# Patient Record
Sex: FEMALE | Race: WHITE | NOT HISPANIC OR LATINO | Employment: OTHER | URBAN - METROPOLITAN AREA
[De-identification: names, ages, dates, MRNs, and addresses within clinical notes are randomized per-mention and may not be internally consistent; named-entity substitution may affect disease eponyms.]

---

## 2017-05-01 ENCOUNTER — ALLSCRIPTS OFFICE VISIT (OUTPATIENT)
Dept: OTHER | Facility: OTHER | Age: 77
End: 2017-05-01

## 2017-05-01 DIAGNOSIS — I10 ESSENTIAL (PRIMARY) HYPERTENSION: ICD-10-CM

## 2017-05-01 DIAGNOSIS — K21.9 GASTRO-ESOPHAGEAL REFLUX DISEASE WITHOUT ESOPHAGITIS: ICD-10-CM

## 2017-05-01 DIAGNOSIS — M79.10 MYALGIA: ICD-10-CM

## 2017-05-01 DIAGNOSIS — Z13.820 ENCOUNTER FOR SCREENING FOR OSTEOPOROSIS: ICD-10-CM

## 2017-05-01 DIAGNOSIS — E03.9 HYPOTHYROIDISM: ICD-10-CM

## 2017-05-01 DIAGNOSIS — Z12.31 ENCOUNTER FOR SCREENING MAMMOGRAM FOR MALIGNANT NEOPLASM OF BREAST: ICD-10-CM

## 2017-05-01 DIAGNOSIS — E78.5 HYPERLIPIDEMIA: ICD-10-CM

## 2017-05-01 DIAGNOSIS — M25.50 PAIN IN JOINT: ICD-10-CM

## 2017-05-05 ENCOUNTER — LAB CONVERSION - ENCOUNTER (OUTPATIENT)
Dept: OTHER | Facility: OTHER | Age: 77
End: 2017-05-05

## 2017-05-05 LAB
A/G RATIO (HISTORICAL): 1.5 (CALC) (ref 1–2.5)
ALBUMIN SERPL BCP-MCNC: 4 G/DL (ref 3.6–5.1)
ALP SERPL-CCNC: 75 U/L (ref 33–130)
ALT SERPL W P-5'-P-CCNC: 36 U/L (ref 6–29)
ANTI-NUCLEAR ANTIBODY (ANA) (HISTORICAL): NEGATIVE
AST SERPL W P-5'-P-CCNC: 24 U/L (ref 10–35)
BASOPHILS # BLD AUTO: 0.1 %
BASOPHILS # BLD AUTO: 9 CELLS/UL (ref 0–200)
BILIRUB SERPL-MCNC: 0.6 MG/DL (ref 0.2–1.2)
BUN SERPL-MCNC: 14 MG/DL (ref 7–25)
BUN/CREA RATIO (HISTORICAL): ABNORMAL (CALC) (ref 6–22)
C-REACTIVE PROTEIN (HISTORICAL): 0.86 MG/DL
CALCIUM SERPL-MCNC: 9.1 MG/DL (ref 8.6–10.4)
CHLORIDE SERPL-SCNC: 101 MMOL/L (ref 98–110)
CHOLEST SERPL-MCNC: 265 MG/DL (ref 125–200)
CHOLEST/HDLC SERPL: 5 (CALC)
CO2 SERPL-SCNC: 28 MMOL/L (ref 20–31)
CREAT SERPL-MCNC: 0.77 MG/DL (ref 0.6–0.93)
DEPRECATED RDW RBC AUTO: 14.3 % (ref 11–15)
EGFR AFRICAN AMERICAN (HISTORICAL): 88 ML/MIN/1.73M2
EGFR-AMERICAN CALC (HISTORICAL): 76 ML/MIN/1.73M2
EOSINOPHIL # BLD AUTO: 3.9 %
EOSINOPHIL # BLD AUTO: 335 CELLS/UL (ref 15–500)
ERYTHROCYTE SEDIMENTATION RATE (HISTORICAL): 9 MM/H
GAMMA GLOBULIN (HISTORICAL): 2.7 G/DL (CALC) (ref 1.9–3.7)
GLUCOSE (HISTORICAL): 124 MG/DL (ref 65–99)
HCT VFR BLD AUTO: 38 % (ref 35–45)
HDLC SERPL-MCNC: 53 MG/DL
HGB BLD-MCNC: 13.2 G/DL (ref 11.7–15.5)
LDL CHOLESTEROL (HISTORICAL): 174 MG/DL (CALC)
LYME IGG/IGM AB (HISTORICAL): <0.9 INDEX
LYMPHOCYTES # BLD AUTO: 2666 CELLS/UL (ref 850–3900)
LYMPHOCYTES # BLD AUTO: 31 %
MCH RBC QN AUTO: 30.1 PG (ref 27–33)
MCHC RBC AUTO-ENTMCNC: 34.7 G/DL (ref 32–36)
MCV RBC AUTO: 86.7 FL (ref 80–100)
MONOCYTES # BLD AUTO: 791 CELLS/UL (ref 200–950)
MONOCYTES (HISTORICAL): 9.2 %
NEUTROPHILS # BLD AUTO: 4799 CELLS/UL (ref 1500–7800)
NEUTROPHILS # BLD AUTO: 55.8 %
NON-HDL-CHOL (CHOL-HDL) (HISTORICAL): 212 MG/DL (CALC)
PLATELET # BLD AUTO: 271 THOUSAND/UL (ref 140–400)
PMV BLD AUTO: 10 FL (ref 7.5–12.5)
POTASSIUM SERPL-SCNC: 3 MMOL/L (ref 3.5–5.3)
RBC # BLD AUTO: 4.39 MILLION/UL (ref 3.8–5.1)
RHEUMATOID FACTOR (HISTORICAL): 9 IU/ML
SODIUM SERPL-SCNC: 145 MMOL/L (ref 135–146)
T4 TOTAL (HISTORICAL): 9.4 MCG/DL (ref 4.5–12)
TOTAL PROTEIN (HISTORICAL): 6.7 G/DL (ref 6.1–8.1)
TRIGL SERPL-MCNC: 192 MG/DL
TSH SERPL DL<=0.05 MIU/L-ACNC: 4.49 MIU/L (ref 0.4–4.5)
WBC # BLD AUTO: 8.6 THOUSAND/UL (ref 3.8–10.8)

## 2017-05-06 ENCOUNTER — GENERIC CONVERSION - ENCOUNTER (OUTPATIENT)
Dept: OTHER | Facility: OTHER | Age: 77
End: 2017-05-06

## 2017-05-15 ENCOUNTER — ALLSCRIPTS OFFICE VISIT (OUTPATIENT)
Dept: OTHER | Facility: OTHER | Age: 77
End: 2017-05-15

## 2017-08-15 DIAGNOSIS — Z79.899 OTHER LONG TERM (CURRENT) DRUG THERAPY: ICD-10-CM

## 2017-08-15 DIAGNOSIS — E78.5 HYPERLIPIDEMIA: ICD-10-CM

## 2017-08-28 ENCOUNTER — LAB CONVERSION - ENCOUNTER (OUTPATIENT)
Dept: OTHER | Facility: OTHER | Age: 77
End: 2017-08-28

## 2017-08-28 ENCOUNTER — ALLSCRIPTS OFFICE VISIT (OUTPATIENT)
Dept: OTHER | Facility: OTHER | Age: 77
End: 2017-08-28

## 2017-08-28 LAB
CHOLEST SERPL-MCNC: 282 MG/DL
CHOLEST/HDLC SERPL: 5.9 (CALC)
HDLC SERPL-MCNC: 48 MG/DL
LDL CHOLESTEROL (HISTORICAL): 196 MG/DL (CALC)
NON-HDL-CHOL (CHOL-HDL) (HISTORICAL): 234 MG/DL (CALC)
TRIGL SERPL-MCNC: 198 MG/DL

## 2017-08-29 ENCOUNTER — GENERIC CONVERSION - ENCOUNTER (OUTPATIENT)
Dept: OTHER | Facility: OTHER | Age: 77
End: 2017-08-29

## 2017-09-08 ENCOUNTER — LAB CONVERSION - ENCOUNTER (OUTPATIENT)
Dept: OTHER | Facility: OTHER | Age: 77
End: 2017-09-08

## 2017-09-08 ENCOUNTER — GENERIC CONVERSION - ENCOUNTER (OUTPATIENT)
Dept: OTHER | Facility: OTHER | Age: 77
End: 2017-09-08

## 2017-09-08 LAB — FECAL GLOBIN BY IMMUNOCHEM (HISTORICAL): NORMAL

## 2017-11-13 ENCOUNTER — HOSPITAL ENCOUNTER (INPATIENT)
Facility: HOSPITAL | Age: 77
LOS: 2 days | Discharge: HOME HEALTH | DRG: 065 | End: 2017-11-15
Attending: EMERGENCY MEDICINE | Admitting: INTERNAL MEDICINE
Payer: COMMERCIAL

## 2017-11-13 ENCOUNTER — APPOINTMENT (EMERGENCY)
Dept: RADIOLOGY | Facility: HOSPITAL | Age: 77
DRG: 065 | End: 2017-11-13
Payer: COMMERCIAL

## 2017-11-13 ENCOUNTER — APPOINTMENT (EMERGENCY)
Dept: CT IMAGING | Facility: HOSPITAL | Age: 77
DRG: 065 | End: 2017-11-13
Payer: COMMERCIAL

## 2017-11-13 ENCOUNTER — APPOINTMENT (INPATIENT)
Dept: MRI IMAGING | Facility: HOSPITAL | Age: 77
DRG: 065 | End: 2017-11-13
Payer: COMMERCIAL

## 2017-11-13 DIAGNOSIS — I63.9 ACUTE CVA (CEREBROVASCULAR ACCIDENT) (HCC): ICD-10-CM

## 2017-11-13 DIAGNOSIS — R20.0 NUMBNESS: ICD-10-CM

## 2017-11-13 DIAGNOSIS — E87.6 HYPOKALEMIA: ICD-10-CM

## 2017-11-13 DIAGNOSIS — I10 HYPERTENSION: ICD-10-CM

## 2017-11-13 DIAGNOSIS — R20.0 LUE NUMBNESS: ICD-10-CM

## 2017-11-13 DIAGNOSIS — R29.898 WEAKNESS OF RIGHT LOWER EXTREMITY: Primary | ICD-10-CM

## 2017-11-13 PROBLEM — M54.31 RIGHT SIDED SCIATICA: Chronic | Status: ACTIVE | Noted: 2017-11-13

## 2017-11-13 PROBLEM — E78.5 HLD (HYPERLIPIDEMIA): Status: ACTIVE | Noted: 2017-11-13

## 2017-11-13 LAB
ALBUMIN SERPL BCP-MCNC: 3.7 G/DL (ref 3.5–5)
ALP SERPL-CCNC: 85 U/L (ref 46–116)
ALT SERPL W P-5'-P-CCNC: 49 U/L (ref 12–78)
ANION GAP SERPL CALCULATED.3IONS-SCNC: 10 MMOL/L (ref 4–13)
APTT PPP: 28 SECONDS (ref 23–35)
AST SERPL W P-5'-P-CCNC: 29 U/L (ref 5–45)
ATRIAL RATE: 97 BPM
BASOPHILS # BLD AUTO: 0.02 THOUSANDS/ΜL (ref 0–0.1)
BASOPHILS NFR BLD AUTO: 0 % (ref 0–1)
BILIRUB SERPL-MCNC: 0.74 MG/DL (ref 0.2–1)
BUN SERPL-MCNC: 13 MG/DL (ref 5–25)
CALCIUM SERPL-MCNC: 8.7 MG/DL (ref 8.3–10.1)
CHLORIDE SERPL-SCNC: 100 MMOL/L (ref 100–108)
CO2 SERPL-SCNC: 32 MMOL/L (ref 21–32)
CREAT SERPL-MCNC: 0.69 MG/DL (ref 0.6–1.3)
EOSINOPHIL # BLD AUTO: 0.14 THOUSAND/ΜL (ref 0–0.61)
EOSINOPHIL NFR BLD AUTO: 2 % (ref 0–6)
ERYTHROCYTE [DISTWIDTH] IN BLOOD BY AUTOMATED COUNT: 13.1 % (ref 11.6–15.1)
GFR SERPL CREATININE-BSD FRML MDRD: 85 ML/MIN/1.73SQ M
GLUCOSE SERPL-MCNC: 144 MG/DL (ref 65–140)
HCT VFR BLD AUTO: 41.9 % (ref 34.8–46.1)
HGB BLD-MCNC: 14.7 G/DL (ref 11.5–15.4)
INR PPP: 0.98 (ref 0.86–1.16)
LYMPHOCYTES # BLD AUTO: 2.15 THOUSANDS/ΜL (ref 0.6–4.47)
LYMPHOCYTES NFR BLD AUTO: 25 % (ref 14–44)
MCH RBC QN AUTO: 30.9 PG (ref 26.8–34.3)
MCHC RBC AUTO-ENTMCNC: 35.1 G/DL (ref 31.4–37.4)
MCV RBC AUTO: 88 FL (ref 82–98)
MONOCYTES # BLD AUTO: 0.98 THOUSAND/ΜL (ref 0.17–1.22)
MONOCYTES NFR BLD AUTO: 11 % (ref 4–12)
NEUTROPHILS # BLD AUTO: 5.46 THOUSANDS/ΜL (ref 1.85–7.62)
NEUTS SEG NFR BLD AUTO: 62 % (ref 43–75)
NRBC BLD AUTO-RTO: 0 /100 WBCS
NT-PROBNP SERPL-MCNC: 558 PG/ML
P AXIS: 58 DEGREES
PLATELET # BLD AUTO: 260 THOUSANDS/UL (ref 149–390)
PMV BLD AUTO: 11.7 FL (ref 8.9–12.7)
POTASSIUM SERPL-SCNC: 2.6 MMOL/L (ref 3.5–5.3)
PR INTERVAL: 132 MS
PROT SERPL-MCNC: 7.6 G/DL (ref 6.4–8.2)
PROTHROMBIN TIME: 13 SECONDS (ref 12.1–14.4)
QRS AXIS: 47 DEGREES
QRSD INTERVAL: 70 MS
QT INTERVAL: 362 MS
QTC INTERVAL: 459 MS
RBC # BLD AUTO: 4.75 MILLION/UL (ref 3.81–5.12)
SODIUM SERPL-SCNC: 142 MMOL/L (ref 136–145)
SPECIMEN SOURCE: NORMAL
T WAVE AXIS: 77 DEGREES
TROPONIN I BLD-MCNC: 0.02 NG/ML (ref 0–0.08)
VENTRICULAR RATE: 97 BPM
WBC # BLD AUTO: 8.75 THOUSAND/UL (ref 4.31–10.16)

## 2017-11-13 PROCEDURE — 70551 MRI BRAIN STEM W/O DYE: CPT

## 2017-11-13 PROCEDURE — 93005 ELECTROCARDIOGRAM TRACING: CPT | Performed by: EMERGENCY MEDICINE

## 2017-11-13 PROCEDURE — 84484 ASSAY OF TROPONIN QUANT: CPT

## 2017-11-13 PROCEDURE — 83880 ASSAY OF NATRIURETIC PEPTIDE: CPT | Performed by: EMERGENCY MEDICINE

## 2017-11-13 PROCEDURE — 36415 COLL VENOUS BLD VENIPUNCTURE: CPT | Performed by: EMERGENCY MEDICINE

## 2017-11-13 PROCEDURE — 85610 PROTHROMBIN TIME: CPT | Performed by: EMERGENCY MEDICINE

## 2017-11-13 PROCEDURE — 85730 THROMBOPLASTIN TIME PARTIAL: CPT | Performed by: EMERGENCY MEDICINE

## 2017-11-13 PROCEDURE — 85025 COMPLETE CBC W/AUTO DIFF WBC: CPT | Performed by: EMERGENCY MEDICINE

## 2017-11-13 PROCEDURE — 80053 COMPREHEN METABOLIC PANEL: CPT | Performed by: EMERGENCY MEDICINE

## 2017-11-13 PROCEDURE — 70450 CT HEAD/BRAIN W/O DYE: CPT

## 2017-11-13 PROCEDURE — 96365 THER/PROPH/DIAG IV INF INIT: CPT

## 2017-11-13 PROCEDURE — 70544 MR ANGIOGRAPHY HEAD W/O DYE: CPT

## 2017-11-13 PROCEDURE — 99285 EMERGENCY DEPT VISIT HI MDM: CPT

## 2017-11-13 PROCEDURE — 71020 HB CHEST X-RAY 2VW FRONTAL&LATL: CPT

## 2017-11-13 RX ORDER — HYDRALAZINE HYDROCHLORIDE 20 MG/ML
5 INJECTION INTRAMUSCULAR; INTRAVENOUS EVERY 4 HOURS PRN
Status: DISCONTINUED | OUTPATIENT
Start: 2017-11-13 | End: 2017-11-15 | Stop reason: HOSPADM

## 2017-11-13 RX ORDER — POTASSIUM CHLORIDE 14.9 MG/ML
20 INJECTION INTRAVENOUS ONCE
Status: COMPLETED | OUTPATIENT
Start: 2017-11-13 | End: 2017-11-13

## 2017-11-13 RX ORDER — ACETAMINOPHEN 325 MG/1
650 TABLET ORAL EVERY 4 HOURS PRN
Status: DISCONTINUED | OUTPATIENT
Start: 2017-11-13 | End: 2017-11-15 | Stop reason: HOSPADM

## 2017-11-13 RX ORDER — ASPIRIN 325 MG
325 TABLET ORAL DAILY
Status: DISCONTINUED | OUTPATIENT
Start: 2017-11-13 | End: 2017-11-15 | Stop reason: HOSPADM

## 2017-11-13 RX ORDER — LABETALOL HYDROCHLORIDE 5 MG/ML
10 INJECTION, SOLUTION INTRAVENOUS ONCE
Status: DISCONTINUED | OUTPATIENT
Start: 2017-11-13 | End: 2017-11-13

## 2017-11-13 RX ORDER — ONDANSETRON 2 MG/ML
4 INJECTION INTRAMUSCULAR; INTRAVENOUS EVERY 6 HOURS PRN
Status: CANCELLED | OUTPATIENT
Start: 2017-11-13

## 2017-11-13 RX ORDER — POTASSIUM CHLORIDE 20 MEQ/1
40 TABLET, EXTENDED RELEASE ORAL
Status: COMPLETED | OUTPATIENT
Start: 2017-11-13 | End: 2017-11-14

## 2017-11-13 RX ORDER — POTASSIUM CHLORIDE 20 MEQ/1
40 TABLET, EXTENDED RELEASE ORAL ONCE
Status: COMPLETED | OUTPATIENT
Start: 2017-11-13 | End: 2017-11-13

## 2017-11-13 RX ORDER — ATORVASTATIN CALCIUM 40 MG/1
40 TABLET, FILM COATED ORAL EVERY EVENING
Status: DISCONTINUED | OUTPATIENT
Start: 2017-11-13 | End: 2017-11-14

## 2017-11-13 RX ORDER — AMLODIPINE BESYLATE 5 MG/1
10 TABLET ORAL 2 TIMES DAILY
COMMUNITY
End: 2018-01-26 | Stop reason: DRUGHIGH

## 2017-11-13 RX ADMIN — POTASSIUM CHLORIDE 20 MEQ: 200 INJECTION, SOLUTION INTRAVENOUS at 11:13

## 2017-11-13 RX ADMIN — POTASSIUM CHLORIDE 20 MEQ: 200 INJECTION, SOLUTION INTRAVENOUS at 16:16

## 2017-11-13 RX ADMIN — HYDRALAZINE HYDROCHLORIDE 5 MG: 20 INJECTION INTRAMUSCULAR; INTRAVENOUS at 18:04

## 2017-11-13 RX ADMIN — HYDRALAZINE HYDROCHLORIDE 5 MG: 20 INJECTION INTRAMUSCULAR; INTRAVENOUS at 13:35

## 2017-11-13 RX ADMIN — ENOXAPARIN SODIUM 40 MG: 40 INJECTION SUBCUTANEOUS at 18:04

## 2017-11-13 RX ADMIN — ASPIRIN 325 MG: 325 TABLET ORAL at 12:29

## 2017-11-13 RX ADMIN — POTASSIUM CHLORIDE 40 MEQ: 1500 TABLET, EXTENDED RELEASE ORAL at 18:04

## 2017-11-13 RX ADMIN — POTASSIUM CHLORIDE 40 MEQ: 1500 TABLET, EXTENDED RELEASE ORAL at 11:10

## 2017-11-13 NOTE — ED PROVIDER NOTES
History  Chief Complaint   Patient presents with    Numbness     c/o left arm numbness and right leg numbness started at approx  0840  No stroke hx  Denies cp/sob/headache  c/o feeling lightheaded  68 YO female presents with numbness and weakness noticed this morning on waking  Pt states she feels decreased sensation as well as tingling in the Right upper and lower extremities  She states she did walk but but feels weaker in the Right leg, notes difficulty with walking  Pt notes she has been on amlodipine, prescribed by her PCP, feels this has not been adequately treating her blood pressure  Pt does have some lightheadedness, denies chest pain, shortness of breath or headaches  Pt denies CP/SOB/F/C/N/V/D/C, no dysuria, burning on urination or blood in urine  History provided by:  Patient and relative   used: No    CVA/TIA-like Symptoms   Presenting symptoms: sensory loss    Presenting symptoms: no confusion, no headaches and no weakness    Onset quality:  Unable to specify  Timing:  Constant  Progression:  Unchanged  Associated symptoms: no chest pain, no dizziness, no fall, no fever, no bladder incontinence, no seizures, no tinnitus and no vomiting        Prior to Admission Medications   Prescriptions Last Dose Informant Patient Reported? Taking? Omeprazole Magnesium (PRILOSEC OTC PO) 11/13/2017 at Unknown time  Yes Yes   Sig: Take by mouth daily Indications: unsure of dose  Riboflavin 400 MG CAPS Unknown at Unknown time  No No   Sig: Take 1 capsule by mouth daily  amLODIPine (NORVASC) 5 mg tablet 11/13/2017 at Unknown time  Yes Yes   Sig: Take 5 mg by mouth daily   aspirin 81 MG tablet 11/13/2017 at Unknown time  Yes Yes   Sig: Take 81 mg by mouth daily  Facility-Administered Medications: None       Past Medical History:   Diagnosis Date    Hyperlipidemia     Hypertension        History reviewed  No pertinent surgical history  History reviewed   No pertinent family history  I have reviewed and agree with the history as documented  Social History   Substance Use Topics    Smoking status: Former Smoker    Smokeless tobacco: Never Used    Alcohol use No        Review of Systems   Constitutional: Negative for chills, fatigue and fever  HENT: Negative for dental problem and tinnitus  Eyes: Negative for visual disturbance  Respiratory: Negative for shortness of breath  Cardiovascular: Negative for chest pain  Gastrointestinal: Negative for abdominal pain, diarrhea and vomiting  Genitourinary: Negative for bladder incontinence, dysuria and frequency  Musculoskeletal: Negative for arthralgias  Skin: Negative for rash  Neurological: Negative for dizziness, seizures, weakness, light-headedness and headaches  Psychiatric/Behavioral: Negative for agitation, behavioral problems and confusion  All other systems reviewed and are negative  Physical Exam  ED Triage Vitals   Temperature Pulse Respirations Blood Pressure SpO2   11/13/17 1002 11/13/17 1002 11/13/17 1002 11/13/17 1002 11/13/17 1002   98 2 °F (36 8 °C) 91 18 (!) 226/132 97 %      Temp Source Heart Rate Source Patient Position - Orthostatic VS BP Location FiO2 (%)   11/13/17 1002 11/13/17 1054 11/13/17 1054 11/13/17 1054 --   Temporal Monitor Lying Right arm       Pain Score       11/13/17 1002       No Pain           Orthostatic Vital Signs  Vitals:    11/13/17 1229 11/13/17 1331 11/13/17 1503 11/13/17 1750   BP: (!) 182/89 (!) 204/93 169/74 (!) 200/90   Pulse: 81 87 84 91   Patient Position - Orthostatic VS: Lying Lying Lying Lying       Physical Exam   Constitutional: She is oriented to person, place, and time  She appears well-developed and well-nourished  HENT:   Head: Normocephalic and atraumatic  Eyes: EOM are normal  Pupils are equal, round, and reactive to light  Neck: Normal range of motion  Cardiovascular: Normal rate, regular rhythm and normal heart sounds  Pulmonary/Chest: Effort normal and breath sounds normal    Abdominal: Soft  Musculoskeletal: Normal range of motion  Neurological: She is alert and oriented to person, place, and time  Skin: Skin is warm and dry  Psychiatric: She has a normal mood and affect  Her behavior is normal  Thought content normal    Nursing note and vitals reviewed        ED Medications  Medications   aspirin tablet 325 mg (325 mg Oral Given 11/13/17 1229)   atorvastatin (LIPITOR) tablet 40 mg (40 mg Oral Given 11/13/17 1804)   acetaminophen (TYLENOL) tablet 650 mg (not administered)   enoxaparin (LOVENOX) subcutaneous injection 40 mg (40 mg Subcutaneous Given 11/13/17 1804)   potassium chloride (K-DUR,KLOR-CON) CR tablet 40 mEq (40 mEq Oral Given 11/13/17 1804)   hydrALAZINE (APRESOLINE) injection 5 mg (5 mg Intravenous Given 11/13/17 1804)   potassium chloride 20 mEq IVPB (premix) (0 mEq Intravenous Stopped 11/13/17 1317)   potassium chloride (K-DUR,KLOR-CON) CR tablet 40 mEq (40 mEq Oral Given 11/13/17 1110)   potassium chloride 20 mEq IVPB (premix) (20 mEq Intravenous New Bag 11/13/17 1616)       Diagnostic Studies  Results Reviewed     Procedure Component Value Units Date/Time    Comprehensive metabolic panel [[id number]]  (Abnormal) Collected:  11/13/17 1019    Lab Status:  Final result Specimen:  Blood from Arm, Left Updated:  11/13/17 1052     Sodium 142 mmol/L      Potassium 2 6 (LL) mmol/L      Chloride 100 mmol/L      CO2 32 mmol/L      Anion Gap 10 mmol/L      BUN 13 mg/dL      Creatinine 0 69 mg/dL      Glucose 144 (H) mg/dL      Calcium 8 7 mg/dL      AST 29 U/L      ALT 49 U/L      Alkaline Phosphatase 85 U/L      Total Protein 7 6 g/dL      Albumin 3 7 g/dL      Total Bilirubin 0 74 mg/dL      eGFR 85 ml/min/1 73sq m     Narrative:         National Kidney Disease Education Program recommendations are as follows:  GFR calculation is accurate only with a steady state creatinine  Chronic Kidney disease less than 60 ml/min/1 73 sq  meters  Kidney failure less than 15 ml/min/1 73 sq  meters  B-type natriuretic peptide [[id number]]  (Abnormal) Collected:  11/13/17 1019    Lab Status:  Final result Specimen:  Blood from Arm, Left Updated:  11/13/17 1050     NT-proBNP 558 (H) pg/mL     Protime-INR [[id number]]  (Normal) Collected:  11/13/17 1019    Lab Status:  Final result Specimen:  Blood from Arm, Left Updated:  11/13/17 1035     Protime 13 0 seconds      INR 0 98    APTT [[id number]]  (Normal) Collected:  11/13/17 1019    Lab Status:  Final result Specimen:  Blood from Arm, Left Updated:  11/13/17 1035     PTT 28 seconds     Narrative: Therapeutic Heparin Range = 60-90 seconds    POCT troponin [[id number]]  (Normal) Collected:  11/13/17 1014    Lab Status:  Final result Updated:  11/13/17 1027     POC Troponin I 0 02 ng/ml      Specimen Type VENOUS    Narrative:         [employer] i-Stat handheld analyzer 99% cutoff is > 0 08ng/mL in [facility] Emergency Dep[facility]    o cTnI 99% cutoff is useful only when applied to patients in the clinical setting of myocardial ischemia  o cTnI 99% cutoff should be interpreted in the context of clinical history, ECG findings and possibly cardiac imaging to establish correct diagnosis  o cTnI 99% cutoff may be suggestive but clearly not indicative of a coronary event without the clinical setting of myocardial ischemia      CBC and differential [[id number]]  (Normal) Collected:  11/13/17 1019    Lab Status:  Final result Specimen:  Blood from Arm, Left Updated:  11/13/17 1026     WBC 8 75 Thousand/uL      RBC 4 75 Million/uL      Hemoglobin 14 7 g/dL      Hematocrit 41 9 %      MCV 88 fL      MCH 30 9 pg      MCHC 35 1 g/dL      RDW 13 1 %      MPV 11 7 fL      Platelets 258 Thousands/uL      nRBC 0 /100 WBCs      Neutrophils Relative 62 %      Lymphocytes Relative 25 %      Monocytes Relative 11 %      Eosinophils Relative 2 %      Basophils Relative 0 %      Neutrophils Absolute 5 46 Thousands/µL Lymphocytes Absolute 2 15 Thousands/µL      Monocytes Absolute 0 98 Thousand/µL      Eosinophils Absolute 0 14 Thousand/µL      Basophils Absolute 0 02 Thousands/µL                  X-ray chest 2 views   Final Result by Roxanna Mohr DO (11/13 1126)      No active pulmonary disease  Workstation performed: [id number]         CT head without contrast   Final Result by Nima Andrade MD (11/13 1047)      No acute intracranial abnormality  Microangiopathic changes  Workstation performed: [id number]         MRI brain wo contrast    (Results Pending)   MRA and or MRV head wo contrast    (Results Pending)              Procedures  Procedures       Phone Contacts  ED Phone Contact    ED Course  ED Course              NIH Stroke Scale    Flowsheet Row Most Recent Value   Level of Consciousness (1a )  0 Filed at: 11/13/2017 1750   LOC Questions (1b )  0 Filed at: 11/13/2017 1750   LOC Commands (1c )  0 Filed at: 11/13/2017 1750   Best Gaze (2 )  0 Filed at: 11/13/2017 1750   Visual (3 )  0 Filed at: 11/13/2017 1750   Facial Palsy (4 )  0 Filed at: 11/13/2017 1750   Motor Arm, Left (5a )  0 Filed at: 11/13/2017 1750   Motor Arm, Right (5b )  0 Filed at: 11/13/2017 1750   Motor Leg, Left (6a )  0 Filed at: 11/13/2017 1750   Motor Leg, Right (6b )  0 Filed at: 11/13/2017 1750   Limb Ataxia (7 )  0 Filed at: 11/13/2017 1750   Sensory (8 )  0 Filed at: 11/13/2017 1750   Best Language (9 )  0 Filed at: 11/13/2017 1750   Dysarthria (10 )  0 Filed at: 11/13/2017 1750   Extinction and Inattention (11 ) (Formerly Neglect)  0 Filed at: 11/13/2017 1750   Total  0 Filed at: 11/13/2017 1750                        MDM  Number of Diagnoses or Management Options  Hypertension: new and requires workup  Hypokalemia: new and requires workup  Numbness: new and requires workup  Diagnosis management comments: 1  Numbness - Pt with continuing numbness, noted on waking, no objective weakness   Symptoms began during sleep, pt is not a candidate for TPA  Will obtain CT of head, check coag's, fluids and give medications for blood pressure management         Amount and/or Complexity of Data Reviewed  Clinical lab tests: ordered and reviewed  Tests in the radiology section of CPT®: ordered and reviewed  Obtain history from someone other than the patient: yes  Discuss the patient with other providers: yes  Independent visualization of images, tracings, or specimens: yes    Patient Progress  Patient progress: stable    CritCare Time    Disposition  Final diagnoses:   Numbness   Hypertension   Hypokalemia     Time reflects when diagnosis was documented in both MDM as applicable and the Disposition within this note     Time User Action Codes Description Comment    11/13/2017 11:54 AM Marques Fitzpatrick Add [R20 0] Numbness     11/13/2017 11:54 AM Marques Fitzpatrick Add [I10] Hypertension     11/13/2017 11:54 AM Marques Fitzpatrick Add [E87 6] Hypokalemia     11/13/2017 12:28 PM Fernando Ricketts M Modify [E87 6] Hypokalemia     11/13/2017 12:28 PM Sasha Valencia Add [R20 0] LUE numbness     11/13/2017 12:28 PM Fernando Monas M Modify [E87 6] Hypokalemia     11/13/2017 12:28 PM Sasha Valencia Modify [R20 0] LUE numbness     11/13/2017 12:28 PM Fernando Ricketts M Modify [E87 6] Hypokalemia     11/13/2017 12:28 PM Fernando Ricketts M Modify [E87 6] Hypokalemia     11/13/2017 12:28 PM Sasha Valencia Add [R29 898] Weakness of right lower extremity     11/13/2017 12:28 PM Gayaline Monas M Modify [E87 6] Hypokalemia     11/13/2017 12:28 PM Gayaline Monas M Modify [R29 898] Weakness of right lower extremity     11/13/2017 12:28 PM Gayaline Monas M Modify [E87 6] Hypokalemia     11/13/2017 12:29 PM Gayland Monas M Modify [E87 6] Hypokalemia     11/13/2017 12:31 PM Jacquelyn Waite Modify [E87 6] Hypokalemia       ED Disposition     ED Disposition Condition Comment    Admit  Case was discussed with [facility] PA and the patient's admission status was agreed to be Admission Status: inpatient status to the service of Dr Ines Ovalles   Follow-up Information    None       Current Discharge Medication List      CONTINUE these medications which have NOT CHANGED    Details   amLODIPine (NORVASC) 5 mg tablet Take 5 mg by mouth daily      aspirin 81 MG tablet Take 81 mg by mouth daily  Omeprazole Magnesium (PRILOSEC OTC PO) Take by mouth daily Indications: unsure of dose  Riboflavin 400 MG CAPS Take 1 capsule by mouth daily  Qty: 20 Each, Refills: 0           No discharge procedures on file      ED Provider  Electronically Signed by           Lily Milian MD  11/13/17 0719

## 2017-11-13 NOTE — H&P
History and Physical - [facility] Internal Medicine    Patient Information: Duong Holguin 69 y o  female MRN: [id number]  Unit/Bed#: ED 10 Encounter: [id number]  Admitting Physician: Quyen Gonzalez PA-C  PCP: Dafne Osborne DO  Date of Admission:  11/13/17    Assessment/Plan:    Hospital Problem List:     Active Problems:    Weakness of right lower extremity    LUE numbness    HTN (hypertension)    HLD (hyperlipidemia)    Hypokalemia      Plan for the Primary Problem(s):  · Weakness of RLE  · Acute onset associated w/LUE numbness this AM  · Atypical stroke like s/sx however will r/o CVA at this time, may be 2* hypokalemia of 2 6 although s/sx are again unilateral and crossed  · CVA Risk factors: HTN, HLD, former smoker 10 pack year hx, and obesity  · No back pain, no knee injury or fall, new onset s/sx  · CT head negative for acute hemorrhage, ischemia, midline shift, but does demonstrate mild microvascular angiopathy changes  · EKG demonstrates normal sinus rhythm with occasional PAC, however no T-wave flattening or Peakked T-waves  · Chest x-ray unremarkable  · Will start  mg daily, admit patient to stroke pathway, no tPA given last known time of well is unknown  · Check lipid panel, check hemoglobin A1c, allow permissive hypertension with hydralazine p r n   SBP greater than 200 mm of mercury, start telemetry check MRI brain MRA head, check TTE, consult Neurology, neuro checks, Consult PT OT for ambulatory dysfunction  · Patient is confirmed DNI/DNR      Plan for Additional Problems:   Hypokalemia   Moderate-severe, unclear etiology   rec'd K rider 20meq and PO 20meq potassium in ED   Will continue w/additional 40meq today (20meq PO and 20meq IV for 80meq total today and 20meq in AM   Continue telemetry per #1, check magnesium    · HTN  · Hold amlodipine per #1, and hydralazine PRN SBP> 200mmHg    HLD   Pt w/hx of myalgias on zocor/atorvastatin   R/B ratio at this time favors statin initiation as pt has no true allergies   F/u lipid panel        VTE Prophylaxis: Enoxaparin (Lovenox)  / sequential compression device   Code Status: DNI/DNR  POLST: There is no POLST form on file for this patient (pre-hospital)    Anticipated Length of Stay:  Patient will be admitted on an Inpatient basis with an anticipated length of stay of  Greater than 2 midnights  Justification for Hospital Stay: r/o CVA    Total Time for Visit, including Counseling / Coordination of Care: 45 minutes  Greater than 50% of this total time spent on direct patient counseling and coordination of care  Chief Complaint:   Right lower extremity weakness and left upper extremity numbness upon waking this morning  History of Present Illness:    Jorge Devlin is a 69 y o  female who presents with PMH of hypertension, hyperlipidemia, 10 pack year smoking history in former smoker status, obesity coming in the ED for right lower extremity weakness left upper extremity numbness since approximately a m  this morning patient woke up and felt something was slightly off she swung her legs the side of the bed and felt that her right lower extremity was not feeling normal   She started to ambulate and had to lean against the wall as she felt like her 0 entire right leg was going to give out on her  She also felt lightheaded with this  She denied any associated pain in her knee or back or hip with this  She reports this is never happened before  She called her son by telephone as the patient lives home alone and he brought her to the ER for evaluation  She denies any falls  On review of system, the patient also notes some left upper extremity numbness, throughout the 1st through 5th fingers going up into the forearm of the same duration  The patient remembers Last time of being normal was about 2100 hours the night before when she was in bed and she was in her normal state of health at that time        She has no blurry vision/slurred speech, headache or weakness in her RUE or LUE  She has no LLE weakness or numbness  No diarrhea/vomiting  No recent illnesses  No family hx of CVA  She had sciatica problems of the RLE in 08/2017 but her pain is now resolved and she is not having back pain at this time  Review of Systems:    Review of Systems   Constitutional: Negative for chills and fever  Eyes: Negative for photophobia  Respiratory: Negative for shortness of breath  Cardiovascular: Negative for chest pain  Gastrointestinal: Negative for abdominal pain, diarrhea, nausea and vomiting  Musculoskeletal: Positive for arthralgias and gait problem  Neurological: Positive for weakness, light-headedness and numbness  Negative for dizziness, seizures, syncope, facial asymmetry and headaches  All other systems reviewed and are negative  Past Medical and Surgical History:     Past Medical History:   Diagnosis Date    Hyperlipidemia     Hypertension        History reviewed  No pertinent surgical history  Meds/Allergies:    Prior to Admission medications    Medication Sig Start Date End Date Taking? Authorizing Provider   amLODIPine (NORVASC) 5 mg tablet Take 5 mg by mouth daily   Yes Historical Provider, MD   aspirin 81 MG tablet Take 81 mg by mouth daily  Yes Historical Provider, MD   Omeprazole Magnesium (PRILOSEC OTC PO) Take by mouth daily Indications: unsure of dose  Yes Historical Provider, MD   Riboflavin 400 MG CAPS Take 1 capsule by mouth daily  3/30/16   Delmi Capellan MD   magnesium oxide (MAG-OX) 400 mg Take 1 tablet (400 mg total) by mouth daily  3/30/16 11/13/17  Delmi Capellan MD   simvastatin (ZOCOR) 20 mg tablet Take 20 mg by mouth daily at bedtime Indications: pt has not taken this for 3 days now due to sx   11/13/17  Historical Provider, MD     I have reviewed home medications with patient personally  Allergies:    Allergies   Allergen Reactions    Penicillins Swelling    Sulfa Antibiotics Hives Social History:     Marital Status: Single   Occupation:   Patient Pre-hospital Living Situation:   Patient Pre-hospital Level of Mobility:   Patient Pre-hospital Diet Restrictions:   Substance Use History:   History   Alcohol Use No     History   Smoking Status    Former Smoker   Smokeless Tobacco    Never Used     History   Drug Use No       Family History:    no family hx of CVA    Physical Exam:     Vitals:   Blood Pressure: (!) 182/89 (11/13/17 1229)  Pulse: 81 (11/13/17 1229)  Temperature: 98 2 °F (36 8 °C) (11/13/17 1002)  Temp Source: Temporal (11/13/17 1002)  Respirations: 17 (11/13/17 1229)  Weight - Scale: 86 2 kg (190 lb) (11/13/17 1002)  SpO2: 99 % (11/13/17 1229)    Physical Exam   Constitutional: She is oriented to person, place, and time  She appears well-developed  No distress  HENT:   Head: Normocephalic and atraumatic  Right Ear: External ear normal    Left Ear: External ear normal    Eyes: Conjunctivae are normal  Pupils are equal, round, and reactive to light  Right eye exhibits no discharge  Left eye exhibits no discharge  No scleral icterus  Neck: Normal range of motion  Cardiovascular: Normal rate, regular rhythm, normal heart sounds and intact distal pulses  Exam reveals no gallop and no friction rub  No murmur heard  Pulmonary/Chest: Effort normal and breath sounds normal  No respiratory distress  She has no wheezes  She has no rales  She exhibits no tenderness  Abdominal: Soft  She exhibits no distension  There is no tenderness  There is no rebound and no guarding  obese   Musculoskeletal: She exhibits no edema  Neurological: She is alert and oriented to person, place, and time  She displays normal reflexes  No cranial nerve deficit  She exhibits abnormal muscle tone (3-5 strength in RLE w/ SLR  upper extremities 5/5 b/l w/ strength/elbow f/e, leg strength 5/5 in LLE)   Coordination (no diadochokinesis, differed ambulatory assessment) normal    Crude sensation intact in UE/LE b/l  No pronator drift  Extinction noted w/LUE dorsum of hand and RLE anterior shin    NIH stroke scale of 2  Due to strength in RLE against gravit and 1 for tactile inattention as noted above for total of 3  Skin: Skin is warm and dry  She is not diaphoretic  Psychiatric: She has a normal mood and affect  Vitals reviewed  Additional Data:     Lab Results: I have personally reviewed pertinent reports  Results from last 7 days  Lab Units 11/13/17  1019   WBC Thousand/uL 8 75   HEMOGLOBIN g/dL 14 7   HEMATOCRIT % 41 9   PLATELETS Thousands/uL 260   NEUTROS PCT % 62   LYMPHS PCT % 25   MONOS PCT % 11   EOS PCT % 2       Results from last 7 days  Lab Units 11/13/17  1019   SODIUM mmol/L 142   POTASSIUM mmol/L 2 6*   CHLORIDE mmol/L 100   CO2 mmol/L 32   BUN mg/dL 13   CREATININE mg/dL 0 69   CALCIUM mg/dL 8 7   TOTAL PROTEIN g/dL 7 6   BILIRUBIN TOTAL mg/dL 0 74   ALK PHOS U/L 85   ALT U/L 49   AST U/L 29   GLUCOSE RANDOM mg/dL 144*       Results from last 7 days  Lab Units 11/13/17  1019   INR  0 98       Imaging: I have personally reviewed pertinent reports   , I have personally reviewed pertinent films in PACS and no cardiomegaly, vascular congestion or infiltration on CXR  no dissection or pleural effusion evideneced on PA or lat view    X-ray Chest 2 Views    Result Date: 11/13/2017  Narrative: CHEST INDICATION:  Left arm numbness and dizziness  COMPARISON:  None VIEWS:  Frontal and lateral projections IMAGES:  2 FINDINGS:     Cardiomediastinal silhouette appears unremarkable  The lungs are clear  No pneumothorax or pleural effusion  Visualized osseous structures appear within normal limits for the patient's age  Impression: No active pulmonary disease   Workstation performed: [id number]     Ct Head Without Contrast    Result Date: 11/13/2017  Narrative: CT BRAIN - WITHOUT CONTRAST INDICATION:  Numbness in right leg and left arm COMPARISON:  3/30/2016 TECHNIQUE:  CT examination of the brain was performed  In addition to axial images, coronal reformatted images were created and submitted for interpretation  Radiation dose length product (DLP) for this visit:  981 mGy-cm   This examination, like all CT scans performed in the [facility], was performed utilizing techniques to minimize radiation dose exposure, including the use of iterative reconstruction and automated exposure control  IMAGE QUALITY:  Diagnostic  FINDINGS:  PARENCHYMA:  Decreased attenuation is noted in the supratentorial white matter demonstrating an appearance most consistent with mild microangiopathic change  No intracranial mass, mass effect or midline shift  No CT signs of acute infarction  There is no parenchymal hemorrhage  VENTRICLES AND EXTRA-AXIAL SPACES:  Age-appropriate volume loss is noted  No hydrocephalus  VISUALIZED ORBITS AND PARANASAL SINUSES:  Unremarkable  CALVARIUM AND EXTRACRANIAL SOFT TISSUES:   Normal      Impression: No acute intracranial abnormality  Microangiopathic changes  Workstation performed: [id number]       EKG, Pathology, and Other Studies Reviewed on Admission:   · EKG: NSR w/one PAC  No st changes or T wave inversions, no T wave flattening or peaking    Allscripts / Epic Records Reviewed: Yes     ** Please Note: This note has been constructed using a voice recognition system   **

## 2017-11-14 ENCOUNTER — APPOINTMENT (INPATIENT)
Dept: NON INVASIVE DIAGNOSTICS | Facility: HOSPITAL | Age: 77
DRG: 065 | End: 2017-11-14
Payer: COMMERCIAL

## 2017-11-14 LAB
ANION GAP SERPL CALCULATED.3IONS-SCNC: 8 MMOL/L (ref 4–13)
BUN SERPL-MCNC: 14 MG/DL (ref 5–25)
CALCIUM SERPL-MCNC: 8.2 MG/DL (ref 8.3–10.1)
CHLORIDE SERPL-SCNC: 106 MMOL/L (ref 100–108)
CHOLEST SERPL-MCNC: 230 MG/DL (ref 50–200)
CO2 SERPL-SCNC: 29 MMOL/L (ref 21–32)
CREAT SERPL-MCNC: 0.69 MG/DL (ref 0.6–1.3)
EST. AVERAGE GLUCOSE BLD GHB EST-MCNC: 128 MG/DL
GFR SERPL CREATININE-BSD FRML MDRD: 85 ML/MIN/1.73SQ M
GLUCOSE SERPL-MCNC: 119 MG/DL (ref 65–140)
GLUCOSE SERPL-MCNC: 84 MG/DL (ref 65–140)
HBA1C MFR BLD: 6.1 % (ref 4.2–6.3)
HDLC SERPL-MCNC: 41 MG/DL (ref 40–60)
LDLC SERPL CALC-MCNC: 162 MG/DL (ref 0–100)
MAGNESIUM SERPL-MCNC: 1.3 MG/DL (ref 1.6–2.6)
PA ADP BLD-ACNC: 428 ARU
POTASSIUM SERPL-SCNC: 3 MMOL/L (ref 3.5–5.3)
SODIUM SERPL-SCNC: 143 MMOL/L (ref 136–145)
TRIGL SERPL-MCNC: 134 MG/DL

## 2017-11-14 PROCEDURE — G8978 MOBILITY CURRENT STATUS: HCPCS

## 2017-11-14 PROCEDURE — 83036 HEMOGLOBIN GLYCOSYLATED A1C: CPT | Performed by: PHYSICIAN ASSISTANT

## 2017-11-14 PROCEDURE — 97163 PT EVAL HIGH COMPLEX 45 MIN: CPT

## 2017-11-14 PROCEDURE — G8979 MOBILITY GOAL STATUS: HCPCS

## 2017-11-14 PROCEDURE — 97166 OT EVAL MOD COMPLEX 45 MIN: CPT

## 2017-11-14 PROCEDURE — 83735 ASSAY OF MAGNESIUM: CPT | Performed by: PHYSICIAN ASSISTANT

## 2017-11-14 PROCEDURE — 80061 LIPID PANEL: CPT | Performed by: PHYSICIAN ASSISTANT

## 2017-11-14 PROCEDURE — G8988 SELF CARE GOAL STATUS: HCPCS

## 2017-11-14 PROCEDURE — 93306 TTE W/DOPPLER COMPLETE: CPT

## 2017-11-14 PROCEDURE — 93880 EXTRACRANIAL BILAT STUDY: CPT

## 2017-11-14 PROCEDURE — G8987 SELF CARE CURRENT STATUS: HCPCS

## 2017-11-14 PROCEDURE — 82948 REAGENT STRIP/BLOOD GLUCOSE: CPT

## 2017-11-14 PROCEDURE — 85576 BLOOD PLATELET AGGREGATION: CPT | Performed by: PHYSICIAN ASSISTANT

## 2017-11-14 PROCEDURE — 80048 BASIC METABOLIC PNL TOTAL CA: CPT | Performed by: INTERNAL MEDICINE

## 2017-11-14 RX ORDER — PRAVASTATIN SODIUM 10 MG
10 TABLET ORAL
Status: DISCONTINUED | OUTPATIENT
Start: 2017-11-14 | End: 2017-11-15 | Stop reason: HOSPADM

## 2017-11-14 RX ORDER — PANTOPRAZOLE SODIUM 20 MG/1
20 TABLET, DELAYED RELEASE ORAL
Status: DISCONTINUED | OUTPATIENT
Start: 2017-11-15 | End: 2017-11-15 | Stop reason: HOSPADM

## 2017-11-14 RX ORDER — CLOPIDOGREL BISULFATE 75 MG/1
75 TABLET ORAL DAILY
Status: DISCONTINUED | OUTPATIENT
Start: 2017-11-14 | End: 2017-11-15 | Stop reason: HOSPADM

## 2017-11-14 RX ADMIN — POTASSIUM CHLORIDE 40 MEQ: 1500 TABLET, EXTENDED RELEASE ORAL at 09:18

## 2017-11-14 RX ADMIN — ENOXAPARIN SODIUM 40 MG: 40 INJECTION SUBCUTANEOUS at 09:09

## 2017-11-14 RX ADMIN — PRAVASTATIN SODIUM 10 MG: 10 TABLET ORAL at 18:07

## 2017-11-14 RX ADMIN — ASPIRIN 325 MG: 325 TABLET ORAL at 09:09

## 2017-11-14 RX ADMIN — CLOPIDOGREL BISULFATE 75 MG: 75 TABLET ORAL at 12:16

## 2017-11-14 NOTE — PLAN OF CARE
Problem: Potential for Falls  Goal: Patient will remain free of falls  INTERVENTIONS:  - Assess patient frequently for physical needs  -  Identify cognitive and physical deficits and behaviors that affect risk of falls  -  [place] fall precautions as indicated by assessment   - Educate patient/family on patient safety including physical limitations  - Instruct patient to call for assistance with activity based on assessment  - Modify environment to reduce risk of injury  - Consider OT/PT consult to assist with strengthening/mobility   Outcome: Progressing      Problem: PAIN - ADULT  Goal: Verbalizes/displays adequate comfort level or baseline comfort level  Interventions:  - Encourage patient to monitor pain and request assistance  - Assess pain using appropriate pain scale  - Administer analgesics based on type and severity of pain and evaluate response  - Implement non-pharmacological measures as appropriate and evaluate response  - Consider cultural and social influences on pain and pain management  - Notify physician/advanced practitioner if interventions unsuccessful or patient reports new pain   Outcome: Progressing      Problem: Knowledge Deficit  Goal: Patient/family/caregiver demonstrates understanding of disease process, treatment plan, medications, and discharge instructions  Complete learning assessment and assess knowledge base  Interventions:  - Provide teaching at level of understanding  - Provide teaching via preferred learning methods   Outcome: Progressing      Problem: Neurological Deficit  Goal: Neurological status is stable or improving  Interventions:  - Monitor and assess patient's level of consciousness, motor function, sensory function, and level of assistance needed for ADLs  - Monitor and report changes from baseline  Collaborate with interdisciplinary team to initiate plan and implement interventions as ordered  - Provide and maintain a safe environment    - Utilize seizure precautions  - Utilize fall precautions  - Utilize aspiration precautions  - Utilize bleeding precautions  Outcome: Progressing      Problem: Activity Intolerance/Impaired Mobility  Goal: Mobility/activity is maintained at optimum level for patient  Interventions:  - Assess and monitor patient  barriers to mobility and need for assistive/adaptive devices  - Assess patient's emotional response to limitations  - Collaborate with interdisciplinary team and initiate plans and interventions as ordered  - Encourage independent activity per ability   - Maintain proper body alignment  - Perform active/passive rom as tolerated/ordered  - Plan activities to conserve energy   - Turn patient   Outcome: Progressing      Problem: Communication Impairment  Goal: Ability to express needs and understand communication  Assess patient's communication skills and ability to understand information  Patient will demonstrate use of effective communication techniques, alternative methods of communication and understanding even if not able to speak  - Encourage communication and provide alternate methods of communication as needed  - Collaborate with case management/ for discharge needs  - Include patient/family/caregiver in decisions related to communication  Outcome: Progressing      Problem: Potential for Aspiration  Goal: Non-ventilated patient's risk of aspiration is minimized  Assess and monitor vital signs, respiratory status, and labs (WBC)  Monitor for signs of aspiration (tachypnea, cough, rales, wheezing, cyanosis, fever)  - Assess and monitor patient's ability to swallow  - Place patient up in chair to eat if possible  - HOB up at 90 degrees to eat if unable to get patient up into chair   - Supervise patient during oral intake  - Instruct patient to take small bites  - Instruct patient to take small single sips when taking liquids    - Follow patient-specific strategies generated by speech pathologist    Outcome: Progressing    Goal: Ventilated patient's risk of aspiration is minimized  Assess and monitor vital signs, respiratory status, airway cuff pressure, and labs (WBC)  Monitor for signs of aspiration (tachypnea, cough, rales, wheezing, cyanosis, fever)  - Elevate head of bed 30 degrees if patient has tube feeding   - Monitor tube feeding  Outcome: Progressing      Problem: Nutrition  Goal: Nutrition/Hydration status is improving  Monitor and assess patient's nutrition/hydration status for malnutrition (ex- brittle hair, bruises, dry skin, pale skin and conjunctiva, muscle wasting, smooth red tongue, and disorientation)  Collaborate with interdisciplinary team and initiate plan and interventions as ordered  Monitor patient's weight and dietary intake as ordered or per policy  Utilize nutrition screening tool and intervene per policy  Determine patient's food preferences and provide high-protein, high-caloric foods as appropriate  - Assist patient with eating   - Allow adequate time for meals   - Encourage patient to take dietary supplement as ordered  - Collaborate with clinical nutritionist   - Include patient/family/caregiver in decisions related to nutrition     Outcome: Progressing

## 2017-11-14 NOTE — PHYSICAL THERAPY NOTE
PT Evaluation (19min)  (7:55-8:14)    Past Medical History:   Diagnosis Date    Hyperlipidemia     Hypertension         11/14/17 0814   Note Type   Note type Eval only   Pain Assessment   Pain Assessment No/denies pain   Home Living   Type of [address] One level;Stairs to enter with rails  (3+7 [place])   Prior Function   Level of [place] Independent with ADLs and functional mobility   Lives With Alone   Receives Help From Family  (4 supportive children)   ADL Assistance Independent   Falls in the last 6 months 0   Comments drives short distances only   Restrictions/Precautions   Other Precautions Telemetry; Fall Risk   General   Additional Pertinent History pt presents to [place] c R LE weakness + L UE numbness  MRI (+) 1cm acute infarction L lateral thalamus  PT consulted for mobility + d/c planning  up + OOB  Family/Caregiver Present No   Cognition   Orientation Level Oriented X4   RUE Assessment   RUE Assessment WFL   LUE Assessment   LUE Assessment WFL   RLE Assessment   RLE Assessment WFL  (4-/5)   LLE Assessment   LLE Assessment WFL  (4/5)   Coordination   Movements are Fluid and Coordinated 1   Sensation WFL   Proprioception   RLE Proprioception Grossly intact   LLE Proprioception Grossly Intact   Transfers   Sit to Stand 5  Supervision   Additional items Armrests; Verbal cues; Increased time required   Stand to Sit 5  Supervision   Additional items Armrests; Verbal cues   Ambulation/Elevation   Gait pattern Decreased foot clearance; Inconsistent melody  (decreased R foot clearance)   Gait Assistance 5  Supervision   Additional items Verbal cues   Assistive Device Rolling walker;None   Distance 10' s AD + 61' c RW  (pt "furniture walks" s AD 2* R LE weakness)   Balance   Static Sitting Good   Dynamic Sitting Good   Static Standing Fair   Dynamic Standing Fair -   Ambulatory Fair -   Higher level balance (Tinetti score: 18/28 (high fall risk))   Activity Tolerance   Activity Tolerance Patient limited by fatigue   Nurse Made Aware Kyle Gloss   Assessment   Prognosis Good   Problem List Decreased strength;Decreased endurance; Impaired balance;Decreased mobility   Assessment pt is a 78y/o f who presents to [facility] c stroke like symptoms of R sided weakness + L UE numbness  MRI (+) 1cm acute infarction L lateral thalamus  PMH significant for HTN, hyperlipidemia, hypokalemia, + sciatica  at baseline, pt (I) c functional mobility s AD  resides alone in apt c 3+7 [place]  currently presents c deficits from baseline including impaired strength, balance, gait quality + activity tolerance noted in PT exam above  Barthel Index 70/100  increased time required to complete transitions  pt unsteady s AD while ambulating occassionally reaching for objects for stability  balance improved c use of RW ambulating 60' c (S) + cues for normal gait pattern  decreased foot clearance noted R LE > L which pt states is acute  at increased risk for falls evidenced by Tinnetti score of 18/28  would benefit from skilled PT to maximize functional mobility + return to [facility]  upon d/c, recommend hhpt  may also benefit from [facility] to be assessed c ambulation next PT session  PT eval of high complexity 2* unstable med status c pt currently undergoing workup for acute CVA  pt c multiple co-morbidities impacting PT along c decline in mobility from baseline requiring use of RW for improved stability  pt lives alone in apt c 3 +7 [place] + at high fall risk  Barriers to Discharge Inaccessible home environment;Decreased caregiver support   Goals   Patient Goals "to go home"  STG Expiration Date 11/24/17   Short Term Goal #1 1   increase strength 1/2 grade, 2  perform transfers mod (I), 3  ambulate 300' mod (I)/(I) c no or least restrictive AD to safely navigate apt complex, 4  negotiate 7 steps mod (I) to safely enter apt, 5  improve Tinetti score 5pts to decrease fall risk   Plan   Treatment/Interventions Functional transfer training;LE strengthening/ROM; Elevations; Therapeutic exercise; Endurance training;Patient/family training;Equipment eval/education; Bed mobility;Gait training;Spoke to nursing;OT   PT Frequency 5x/wk; Weekend  (1x wkd)   Recommendation   Recommendation Home PT   Equipment Recommended Other (Comment)  (TBD next session)   PT - OK to Discharge No   Modified [place] Scale   Modified [place] Scale 2   Barthel Index   Feeding 10   Bathing 5   Grooming Score 5   Dressing Score 5   Bladder Score 10   Bowels Score 10   Toilet Use Score 5   Transfers (Bed/Chair) Score 10   Mobility (Level Surface) Score 10   Stairs Score 0   Barthel Index Score 70     Chema Rush, PT

## 2017-11-14 NOTE — PLAN OF CARE
Problem: OCCUPATIONAL THERAPY ADULT  Goal: Performs self-care activities at highest level of function for planned discharge setting  See evaluation for individualized goals  Treatment Interventions: ADL retraining, Functional transfer training, UE strengthening/ROM, Endurance training, Patient/family training, Equipment evaluation/education, Energy conservation, Activityengagement, Compensatory technique education          See flowsheet documentation for full assessment, interventions and recommendations  Limitation: Decreased ADL status, Decreased UE strength, Decreased endurance, Decreased self-care trans, Decreased high-level ADLs (R LE weakness)  Prognosis: Good  Assessment: Pt is a 69 y o  female seen for OT evaluation s/p admit to [facility] on 11/13/2017 w/ R LE weakness and numbness and tingling in R UE  MRI revealed acute infarct L lateral thalamus w/o mass  Comorbidities affecting pt's functional performance at time of assessment include: HTN, sciatica, HLD, obesity  Personal factors affecting pt at time of IE include: lives alone  Prior to admission, pt was living in apartment alone and reports independent w/ ADLs, IADLs, functional transfers and mobility w/ no AD, driving short distance, children will mainly transport her  Upon evaluation: Pt requires supervision sit>stand from chair, supervision UB ADLs, MIN assist LB ADLs, Supervision functional mobility w/ RW, MIN assist functional mobility w/ no AD, MIN assist toileting 2* the following deficits impacting occupational performance: increased fatigue, decreased strength and endurance, impaired dynamic standing balance, 4-/5 strength R UE, R LE weakness  Pt to benefit from continued skilled OT tx while in the hospital to address deficits as defined above and maximize level of functional independence w ADL's and functional mobility   Occupational Performance areas to address include: grooming, bathing/shower, toilet hygiene, dressing, functional mobility and clothing management, home exercise program, home safety education   From OT standpoint, recommendation at time of d/c would be home w/ family support and continued PT       OT Discharge Recommendation: Home with family support         Comments: Nataly Matthews MS, OTR/L

## 2017-11-14 NOTE — PROGRESS NOTES
Progress Note - Iván Khan 69 y o  female MRN: [id number]    Unit/Bed#: E4 -01 Encounter: [id number]      Assessment/Plan:  1-acute CVA presenting with right lower extremity weakness which has now partially improved  MRI reveals acute left thalamic ischemic infarct with multiple areas of intracranial stenosis  Severe likely secondary to small versus thrombosis secondary to E uncontrolled hypertension  This happened despite being on aspirin  Aspirin sensitivity sent  Plavix added  Patient on Pravachol secondary to intolerance to statins  Echocardiogram and carotid ultrasound pending  PT eval pending  2-essential hypertension-allow for permissive elevation of blood pressure  Will add hydralazine after 48 hours  3-hypokalemia-will continue to replete  4-dyslipidemia--patient intolerant of Zocor and atorvastatin  Low-dose Pravachol started  Will also try adding coenzyme Q10     2-EDKE-[id number] PPI    PT eval  DC planning          Subjective:  Patient admitted with weakness of the right lower extremity with partial improvement in her symptoms  MRI reveals acute left thalamic ischemic infarct  MRA head reveals multiple areas of intracranial stenosis  This occurred while the patient was on aspirin  Aspirin sensitivity sent  Plavix added  Patient has intolerance to statin since low-dose Pravachol added along with coenzyme Q10    Physical Exam:   Vitals: Blood pressure (!) 179/81, pulse 83, temperature 97 8 °F (36 6 °C), temperature source Tympanic, resp  rate 18, weight 86 2 kg (190 lb), SpO2 97 %  ,There is no height or weight on file to calculate BMI  Gen:  Pleasant, non-tachypnic, non-dyspnic  Conversant  Heart: regular rate and rhythm, S1S2 present, no murmur, rub or gallop  Lungs: clear to ausculatation bilaterally  No wheezing, crackless, or rhonchi  No accessory muscle use or respiratory distress  Abd: soft, non-tender, non-distended   NABS, no guarding, rebound or peritoneal signs  Extremities: no clubbing, cyanosis or edema  2+pedal pulses bilaterally  Full range of motion  Neuro: awake, alert and oriented  Cranial nerves 2-12 intact  Strength and sensation grossly intact  Skin: warm and dry: no petechiae, purpura and rash      LABS:     Results from last 7 days  Lab Units 11/13/17  1019   WBC Thousand/uL 8 75   HEMOGLOBIN g/dL 14 7   HEMATOCRIT % 41 9   PLATELETS Thousands/uL 260       Results from last 7 days  Lab Units 11/14/17  0533 11/13/17  1019   SODIUM mmol/L 143 142   POTASSIUM mmol/L 3 0* 2 6*   CHLORIDE mmol/L 106 100   CO2 mmol/L 29 32   BUN mg/dL 14 13   CREATININE mg/dL 0 69 0 69   GLUCOSE RANDOM mg/dL 119 144*   CALCIUM mg/dL 8 2* 8 7       Intake/Output Summary (Last 24 hours) at 11/14/17 1652  Last data filed at 11/14/17 1306   Gross per 24 hour   Intake              840 ml   Output                0 ml   Net              840 ml           aspirin 325 mg Oral Daily   clopidogrel 75 mg Oral Daily   enoxaparin 40 mg Subcutaneous Daily   [START ON 11/15/2017] pantoprazole 20 mg Oral Early Morning   pravastatin 10 mg Oral Daily With Dinner

## 2017-11-14 NOTE — CASE MANAGEMENT
Initial Clinical Review    Admission: Date/Time/Statement: 11/13/17 @ 1155     Orders Placed This Encounter   Procedures    Inpatient Admission (expected length of stay for this patient is greater than two midnights)     Standing Status:   Standing     Number of Occurrences:   1     Order Specific Question:   Admitting Physician     Answer:   Patric Centeno [[id number]]     Order Specific Question:   Level of Care     Answer:   Med Surg [[id number]]     Order Specific Question:   Estimated length of stay     Answer:   More than 2 Midnights     Order Specific Question:   Certification     Answer:   I certify that inpatient services are medically necessary for this patient for a duration of greater than two midnights  See H&P and MD Progress Notes for additional information about the patient's course of treatment  ED: Date/Time/Mode of Arrival:   ED Arrival Information     Expected Arrival Acuity Means of Arrival Escorted By Service Admission Type    - 11/13/2017 09:57 Emergent Walk-In Family Member General Medicine Emergency    Arrival Complaint    Right Leg Numbness          Chief Complaint:   Chief Complaint   Patient presents with    Numbness     c/o left arm numbness and right leg numbness started at approx  0840  No stroke hx  Denies cp/sob/headache  c/o feeling lightheaded  History of Illness: Patricia Gomez is a 69 y o  female who presents with PMH of hypertension, hyperlipidemia, 10 pack year smoking history in former smoker status, obesity coming in the ED for right lower extremity weakness left upper extremity numbness since approximately a m  this morning patient woke up and felt something was slightly off she swung her legs the side of the bed and felt that her right lower extremity was not feeling normal   She started to ambulate and had to lean against the wall as she felt like her 0 entire right leg was going to give out on her  She also felt lightheaded with this   She denied any associated pain in her knee or back or hip with this  She reports this is never happened before  She called her son by telephone as the patient lives home alone and he brought her to the ER for evaluation  She denies any falls  On review of system, the patient also notes some left upper extremity numbness, throughout the 1st through 5th fingers going up into the forearm of the same duration  The patient remembers Last time of being normal was about 2100 hours the night before when she was in bed and she was in her normal state of health at that time        She has no blurry vision/slurred speech, headache or weakness in her RUE or LUE  She has no LLE weakness or numbness  No diarrhea/vomiting  No recent illnesses  No family hx of CVA  She had sciatica problems of the RLE in 08/2017 but her pain is now resolved and she is not having back pain at this time        ED Vital Signs:   ED Triage Vitals   Temperature Pulse Respirations Blood Pressure SpO2   11/13/17 1002 11/13/17 1002 11/13/17 1002 11/13/17 1002 11/13/17 1002   98 2 °F (36 8 °C) 91 18 (!) 226/132 97 %      Temp Source Heart Rate Source Patient Position - Orthostatic VS BP Location FiO2 (%)   11/13/17 1002 11/13/17 1054 11/13/17 1054 11/13/17 1054 --   Temporal Monitor Lying Right arm       Pain Score       11/13/17 1002       No Pain        Wt Readings from Last 1 Encounters:   11/13/17 86 2 kg (190 lb)       Vital Signs (abnormal):  96 9 - 79 - 24  204/93 - 180/82  RA 94%    Abnormal Labs/Diagnostic Test Results:   LABS - k 2 6 - 3 0, gs 144, ca 8 2, bnp 558, cholesterol 230,     EKG - Sinus rhythm with Premature atrial complexes  Nonspecific ST abnormality    CT head - No acute intracranial abnormality  Microangiopathic changes      CXR - No active pulmonary disease    MRA brain - Scattered atherosclerotic disease involving the intracranial vasculature including mild to moderate focal narrowing of the left P2 segment of the posterior cerebral artery is patient with acute thalamic infarction      Additional areas of mild to moderate atherosclerotic narrowing narrowing include the right intracranial internal carotid artery, right A1 segment, left distal M1 segment and left distal vertebral artery  MRI brain - Approximately 1 cm acute infarction involving the left lateral thalamus without mass effect or hemorrhagic transformation      Mild cerebral volume loss and scattered white matter change consistent with chronic microangiopathy                ED Treatment:   Medication Administration from 11/13/2017 0956 to 11/13/2017 1615       Date/Time Order Dose Route Action Action by Comments     11/13/2017 1132 labetalol (NORMODYNE) injection 10 mg 0 mg Intravenous Hold Alem Rincon, DOUG      11/13/2017 1317 potassium chloride 20 mEq IVPB (premix) 0 mEq Intravenous Stopped Alem Rincon RN      11/13/2017 1113 potassium chloride 20 mEq IVPB (premix) 20 mEq Intravenous [address] Alem Rincon RN      11/13/2017 1110 potassium chloride (K-DUR,KLOR-CON) CR tablet 40 mEq 40 mEq Oral Given Alem Rincon RN      11/13/2017 1229 aspirin tablet 325 mg 325 mg Oral Given Alem Rincon RN      11/13/2017 1551 Riboflavin CAPS 400 mg 400 mg Oral Not Given Houston Jane RN      11/13/2017 1616 potassium chloride 20 mEq IVPB (premix) 20 mEq Intravenous New Bag Houston Jane RN      11/13/2017 1335 hydrALAZINE (APRESOLINE) injection 5 mg 5 mg Intravenous Given Alem Rincon RN           Past Medical/Surgical History:    Active Ambulatory Problems     Diagnosis Date Noted    Right sided sciatica 11/13/2017     Resolved Ambulatory Problems     Diagnosis Date Noted    No Resolved Ambulatory Problems     Past Medical History:   Diagnosis Date    Allergic rhinitis     GERD (gastroesophageal reflux disease)     Hyperlipidemia     Hypertension        Admitting Diagnosis: Hypokalemia [E87 6]  Numbness [R20 0]  Hypertension [I10]  Leg numbness [R20 0]  LUE numbness [R20 0]  Weakness of right lower extremity [R29 147]    Age/Sex: 69 y o  female    Assessment/Plan: Active Problems:    Weakness of right lower extremity    LUE numbness    HTN (hypertension)    HLD (hyperlipidemia)    Hypokalemia        Plan for the Primary Problem(s):  · Weakness of RLE  ? Acute onset associated w/LUE numbness this AM  ? Atypical stroke like s/sx however will r/o CVA at this time, may be 2* hypokalemia of 2 6 although s/sx are again unilateral and crossed  ? CVA Risk factors: HTN, HLD, former smoker 10 pack year hx, and obesity  ? No back pain, no knee injury or fall, new onset s/sx  ? CT head negative for acute hemorrhage, ischemia, midline shift, but does demonstrate mild microvascular angiopathy changes  ? EKG demonstrates normal sinus rhythm with occasional PAC, however no T-wave flattening or Peakked T-waves  ? Chest x-ray unremarkable  ? Will start  mg daily, admit patient to stroke pathway, no tPA given last known time of well is unknown  ? Check lipid panel, check hemoglobin A1c, allow permissive hypertension with hydralazine p r n  SBP greater than 200 mm of mercury, start telemetry check MRI brain MRA head, check TTE, consult Neurology, neuro checks, Consult PT OT for ambulatory dysfunction  ? Patient is confirmed DNI/DNR        Plan for Additional Problems:   Hypokalemia              Moderate-severe, unclear etiology              rec'd K rider 20meq and PO 20meq potassium in ED              Will continue w/additional 40meq today (20meq PO and 20meq IV for 80meq total today and 20meq in AM              Continue telemetry per #1, check magnesium     · HTN  ?  Hold amlodipine per #1, and hydralazine PRN SBP> 200mmHg     HLD        Pt w/hx of myalgias on zocor/atorvastatin        R/B ratio at this time favors statin initiation as pt has no true allergies        F/u lipid panel           VTE Prophylaxis: Enoxaparin (Lovenox)  / sequential compression device   Code Status: DNI/DNR  POLST: There is no POLST form on file for this patient (pre-hospital)     Anticipated Length of Stay:  Patient will be admitted on an Inpatient basis with an anticipated length of stay of  Greater than 2 midnights  Justification for Hospital Stay: r/o CVA    Admission Orders:  Admit to med surg with telemetry, neuro checks q4h, IS, scd's, oob as tolerated with assistance, dysphagia assessment, regular diet, consult neurology, ECHO, PT/OT eval        Scheduled Meds:   aspirin 325 mg Oral Daily   atorvastatin 40 mg Oral QPM   enoxaparin 40 mg Subcutaneous Daily   potassium chloride 40 mEq Oral TID With Meals     Continuous Infusions:    PRN Meds:   acetaminophen    hydrALAZINE iv x 2        [address] [place] [address] in the [facility] by [facility] for 2017  Network Utilization Review Department  Phone: [phone]; Fax [phone]  ATTENTION: The Network Utilization Review Department is now centralized for our 7 Facilities  Make a note that we have a new phone and fax numbers for our Department  Please call with any questions or concerns to [phone] and carefully follow the prompts so that you are directed to the right person  All voicemails are confidential  Fax any determinations, approvals, denials, and requests for initial or continue stay review clinical to [phone]  Due to HIGH CALL volume, it would be easier if you could please send faxed requests to expedite your requests and in part, help us provide discharge notifications faster

## 2017-11-14 NOTE — PLAN OF CARE
Problem: PHYSICAL THERAPY ADULT  Goal: Performs mobility at highest level of function for planned discharge setting  See evaluation for individualized goals  Treatment/Interventions: Functional transfer training, LE strengthening/ROM, Elevations, Therapeutic exercise, Endurance training, Patient/family training, Equipment eval/education, Bed mobility, Gait training, Spoke to nursing, OT  Equipment Recommended: Other (Comment) (TBD next session)       See flowsheet documentation for full assessment, interventions and recommendations  Prognosis: Good  Problem List: Decreased strength, Decreased endurance, Impaired balance, Decreased mobility  Assessment: pt is a 76y/o f who presents to [facility] c stroke like symptoms of R sided weakness + L UE numbness  MRI (+) 1cm acute infarction L lateral thalamus  PMH significant for HTN, hyperlipidemia, hypokalemia, + sciatica  at baseline, pt (I) c functional mobility s AD  resides alone in apt c [address] [place]  currently presents c deficits from baseline including impaired strength, balance, gait quality + activity tolerance noted in PT exam above  Barthel Index 70/100  increased time required to complete transitions  pt unsteady s AD while ambulating occassionally reaching for objects for stability  balance improved c use of RW ambulating 60' c (S) + cues for normal gait pattern  decreased foot clearance noted R LE > L which pt states is acute  at increased risk for falls evidenced by Tinnetti score of 18/28  would benefit from skilled PT to maximize functional mobility + return to PLOF  upon d/c, recommend hhpt  may also benefit from [facility] to be assessed c ambulation next PT session  PT eval of high complexity 2* unstable med status c pt currently undergoing workup for acute CVA  pt c multiple co-morbidities impacting PT along c decline in mobility from baseline requiring use of RW for improved stability  pt lives alone in apt c [address] [place] + at high fall risk     Barriers to Discharge: Inaccessible home environment, Decreased caregiver support     Recommendation: Home PT     PT - OK to Discharge: No    See flowsheet documentation for full assessment

## 2017-11-14 NOTE — OCCUPATIONAL THERAPY NOTE
[address] Evaluation     Patient Name: Basil Katz Date: 11/14/2017  Problem List  Patient Active Problem List   Diagnosis    Weakness of right lower extremity    LUE numbness    HTN (hypertension)    HLD (hyperlipidemia)    Hypokalemia    Right sided sciatica     Past Medical History  Past Medical History:   Diagnosis Date    Hyperlipidemia     Hypertension      Past Surgical History  History reviewed  No pertinent surgical history          11/14/17 0811   Note Type   Note type Eval/Treat   Restrictions/Precautions   Weight Bearing Precautions Per Order No   Other Precautions Fall Risk;Telemetry   Pain Assessment   Pain Assessment No/denies pain   Pain Score No Pain   Home Living   Type of Home Apartment   Home Layout One level;Stairs to enter with rails  ([address] [place])   Bathroom Shower/Tub Tub/shower unit   Bathroom Toilet Standard   Bathroom Equipment Grab bars in shower   [address] (no DME)   Additional Comments pt driving PTA or family drives her more often   Prior Function   Level of [place] Independent with ADLs and functional mobility   Lives With [address] [place] Help From Family   ADL Assistance Independent   IADLs Independent   Falls in the last 6 months 0   Vocational Retired   Lifestyle   Autonomy per pt independent w/ ADLs, IADLs, functional transfers and mobility w/ no AD, driving   Reciprocal Relationships children   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   Intrinsic Gratification reading and going out w/ friends   ADL   [address] 5  [facility] 5  Supervision/Setup   [address] 5  Supervision/Setup   [address] 5  [address]  5  Supervision/Setup    [place] [address]  4  Minimal Assistance   Bed Mobility   Supine to Sit Unable to assess   Sit to Supine Unable to assess   Additional Comments pt seated in bedside chair pre/post session   Transfers   Sit to Stand 5  Supervision   Additional items Assist x 1; Increased time required   Stand to Sit 5  Supervision   Additional items Assist x 1; Increased time required   Functional Mobility   Functional Mobility 5  Supervision   Additional Comments assist x 1 RW, MIN assist w/ no device   Additional items Rolling walker   Balance   Static Sitting Good   Dynamic Sitting Good   Static Standing Fair   Dynamic Standing Fair   Ambulatory Fair -   Activity Tolerance   Activity Tolerance Patient limited by fatigue   Nurse Made Aware appropriate to see per Stewart CAMACHO Assessment   RUE Assessment WFL  (4-/5)   RUE Strength   RUE Overall Strength (4-/5)   R Shoulder Flexion 4-/5   R Shoulder ABduction 4-/5   R Shoulder Horizontal ABduction 4-/5   R Elbow Flexion 4-/5   LUE Assessment   LUE Assessment WFL  (4/5)   LUE Strength   LUE Overall Strength Within Functional Limits - able to perform ADL tasks with strength   L Shoulder Flexion 4/5   L Shoulder ABduction 4/5   L Shoulder Horizontal ABduction 4/5   L Elbow Flexion 4/5   L Elbow Extension 4/5   Hand Function   Gross Motor Coordination Functional   Fine Motor Coordination Functional   Sensation   Light Touch No apparent deficits   Sharp/Dull No apparent deficits   Proprioception   Proprioception No apparent deficits   Vision-Basic Assessment   Current Vision Wears glasses all the time   Vision - Complex Assessment   Ocular Range of Motion WFL   Acuity Able to read clock/calendar on wall without difficulty   Perception   Inattention/Neglect Appears intact   Cognition   Overall Cognitive Status WFL   Arousal/Participation Alert; Cooperative   Attention Within functional limits   Orientation Level Oriented X4   Memory Within functional limits   Following Commands Follows all commands and directions without difficulty   Comments pt engages in appropriate conversations Assessment   Limitation Decreased ADL status; Decreased UE strength;Decreased endurance;Decreased self-care trans;Decreased high-level ADLs  (R LE weakness)   Prognosis Good   Assessment Pt is a 69 y o  female seen for OT evaluation s/p admit to [facility] on 11/13/2017 w/ R LE weakness and numbness and tingling in R UE  MRI revealed acute infarct L lateral thalamus w/o mass  Comorbidities affecting pt's functional performance at time of assessment include: HTN, sciatica, HLD, obesity  Personal factors affecting pt at time of IE include: lives alone  Prior to admission, pt was living in apartment alone and reports independent w/ ADLs, IADLs, functional transfers and mobility w/ no AD, driving short distance, children will mainly transport her  Upon evaluation: Pt requires supervision sit>stand from chair, supervision UB ADLs, MIN assist LB ADLs, Supervision functional mobility w/ RW, MIN assist functional mobility w/ no AD, MIN assist toileting 2* the following deficits impacting occupational performance: increased fatigue, decreased strength and endurance, impaired dynamic standing balance, 4-/5 strength R UE, R LE weakness  Pt to benefit from continued skilled OT tx while in the hospital to address deficits as defined above and maximize level of functional independence w ADL's and functional mobility  Occupational Performance areas to address include: grooming, bathing/shower, toilet hygiene, dressing, functional mobility and clothing management, home exercise program, home safety education  From OT standpoint, recommendation at time of d/c would be home w/ family support and continued PT  Goals   Patient Goals "go home"   LTG Time Frame 7-10   Long Term Goal please see below goals   Plan   Treatment Interventions ADL retraining;Functional transfer training;UE strengthening/ROM; Endurance training;Patient/family training;Equipment evaluation/education; Energy conservation; Activityengagement; Compensatory technique education   Goal Expiration Date 11/24/17   OT Frequency 3-5x/wk   Recommendation   OT Discharge Recommendation Home with family support   Barthel Index   Feeding 10   Bathing 5   Grooming Score 5   Dressing Score 5   Bladder Score 10   Bowels Score 10   Toilet Use Score 5   Transfers (Bed/Chair) Score 10   Mobility (Level Surface) Score 10   Stairs Score 0   Barthel Index Score 70   Modified Moca Scale   Modified Moca Scale 2     Occupational Therapy Goals to be met in 7-10 days:  1) Pt will improve activity tolerance to G for min 30 min txment sessions  2) Pt will complete ADLs/self care w/ mod I   3) Pt will complete toileting w/ mod I w/ G hygiene/thoroughness using DME PRN  4) Pt will improve functional transfers on/off all surfaces using DME PRN w/ G balance/safety including toileting w/ mod I  5) Pt will improve fx'l mobility during I/ADl/leisure tasks using DME PRN w/ g balance/safety w/ mod I  6) Pt will demonstrate G carryover of pt/caregiver education and training as appropriate w/ mod I  w/ G tolerance  7) Pt will demonstrate 100% carryover of E C  techniques w/ mod I t/o fx'l I/ADL/leisure tasks w/o cues s/p skilled education  8) Pt will demonstrate improved standing tolerance to 3-5 minutes during functional tasks w/ no LOB to enhance ADL performance  9) Pt will demonstrate improved b/l UE strength by 1 MMT grade to enhance ADLS and functional transfers    Documentation completed by: Peter Abreu MS, OTR/L

## 2017-11-14 NOTE — PLAN OF CARE
Problem: Potential for Falls  Goal: Patient will remain free of falls  INTERVENTIONS:  - Assess patient frequently for physical needs  -  Identify cognitive and physical deficits and behaviors that affect risk of falls  -  [place] fall precautions as indicated by assessment   - Educate patient/family on patient safety including physical limitations  - Instruct patient to call for assistance with activity based on assessment  - Modify environment to reduce risk of injury  - Consider OT/PT consult to assist with strengthening/mobility   Outcome: Progressing      Problem: PAIN - ADULT  Goal: Verbalizes/displays adequate comfort level or baseline comfort level  Interventions:  - Encourage patient to monitor pain and request assistance  - Assess pain using appropriate pain scale  - Administer analgesics based on type and severity of pain and evaluate response  - Implement non-pharmacological measures as appropriate and evaluate response  - Consider cultural and social influences on pain and pain management  - Notify physician/advanced practitioner if interventions unsuccessful or patient reports new pain   Outcome: Progressing      Problem: Knowledge Deficit  Goal: Patient/family/caregiver demonstrates understanding of disease process, treatment plan, medications, and discharge instructions  Complete learning assessment and assess knowledge base  Interventions:  - Provide teaching at level of understanding  - Provide teaching via preferred learning methods   Outcome: Progressing      Problem: Neurological Deficit  Goal: Neurological status is stable or improving  Interventions:  - Monitor and assess patient's level of consciousness, motor function, sensory function, and level of assistance needed for ADLs  - Monitor and report changes from baseline  Collaborate with interdisciplinary team to initiate plan and implement interventions as ordered  - Provide and maintain a safe environment    - Utilize seizure precautions  - Utilize fall precautions  - Utilize aspiration precautions  - Utilize bleeding precautions  Outcome: Progressing      Problem: Activity Intolerance/Impaired Mobility  Goal: Mobility/activity is maintained at optimum level for patient  Interventions:  - Assess and monitor patient  barriers to mobility and need for assistive/adaptive devices  - Assess patient's emotional response to limitations  - Collaborate with interdisciplinary team and initiate plans and interventions as ordered  - Encourage independent activity per ability   - Maintain proper body alignment  - Perform active/passive rom as tolerated/ordered  - Plan activities to conserve energy   - Turn patient  Outcome: Progressing      Problem: Communication Impairment  Goal: Ability to express needs and understand communication  Assess patient's communication skills and ability to understand information  Patient will demonstrate use of effective communication techniques, alternative methods of communication and understanding even if not able to speak  - Encourage communication and provide alternate methods of communication as needed  - Collaborate with case management/ for discharge needs  - Include patient/family/caregiver in decisions related to communication  Outcome: Progressing      Problem: Potential for Aspiration  Goal: Non-ventilated patient's risk of aspiration is minimized  Assess and monitor vital signs, respiratory status, and labs (WBC)  Monitor for signs of aspiration (tachypnea, cough, rales, wheezing, cyanosis, fever)  - Assess and monitor patient's ability to swallow  - Place patient up in chair to eat if possible  - HOB up at 90 degrees to eat if unable to get patient up into chair   - Supervise patient during oral intake  - Instruct patient to take small bites  - Instruct patient to take small single sips when taking liquids    - Follow patient-specific strategies generated by speech pathologist   Outcome: Progressing    Goal: Ventilated patient's risk of aspiration is minimized  Assess and monitor vital signs, respiratory status, airway cuff pressure, and labs (WBC)  Monitor for signs of aspiration (tachypnea, cough, rales, wheezing, cyanosis, fever)  - Elevate head of bed 30 degrees if patient has tube feeding   - Monitor tube feeding  Outcome: Progressing      Problem: Nutrition  Goal: Nutrition/Hydration status is improving  Monitor and assess patient's nutrition/hydration status for malnutrition (ex- brittle hair, bruises, dry skin, pale skin and conjunctiva, muscle wasting, smooth red tongue, and disorientation)  Collaborate with interdisciplinary team and initiate plan and interventions as ordered  Monitor patient's weight and dietary intake as ordered or per policy  Utilize nutrition screening tool and intervene per policy  Determine patient's food preferences and provide high-protein, high-caloric foods as appropriate  - Assist patient with eating   - Allow adequate time for meals   - Encourage patient to take dietary supplement as ordered  - Collaborate with clinical nutritionist   - Include patient/family/caregiver in decisions related to nutrition    Outcome: Progressing

## 2017-11-14 NOTE — PLAN OF CARE
Knowledge Deficit     Patient/family/caregiver demonstrates understanding of disease process, treatment plan, medications, and discharge instructions Progressing        PAIN - ADULT     Verbalizes/displays adequate comfort level or baseline comfort level Progressing        Potential for Falls     Patient will remain free of falls Progressing

## 2017-11-15 VITALS
TEMPERATURE: 97.3 F | HEART RATE: 78 BPM | OXYGEN SATURATION: 96 % | RESPIRATION RATE: 18 BRPM | WEIGHT: 190 LBS | SYSTOLIC BLOOD PRESSURE: 169 MMHG | DIASTOLIC BLOOD PRESSURE: 95 MMHG

## 2017-11-15 PROBLEM — R29.898 WEAKNESS OF RIGHT LOWER EXTREMITY: Status: RESOLVED | Noted: 2017-11-13 | Resolved: 2017-11-15

## 2017-11-15 PROBLEM — R20.0 LUE NUMBNESS: Status: RESOLVED | Noted: 2017-11-13 | Resolved: 2017-11-15

## 2017-11-15 PROBLEM — I63.9 ACUTE CVA (CEREBROVASCULAR ACCIDENT) (HCC): Status: ACTIVE | Noted: 2017-11-15

## 2017-11-15 LAB
ANION GAP SERPL CALCULATED.3IONS-SCNC: 8 MMOL/L (ref 4–13)
BUN SERPL-MCNC: 16 MG/DL (ref 5–25)
CALCIUM SERPL-MCNC: 8.2 MG/DL (ref 8.3–10.1)
CHLORIDE SERPL-SCNC: 107 MMOL/L (ref 100–108)
CO2 SERPL-SCNC: 27 MMOL/L (ref 21–32)
CREAT SERPL-MCNC: 0.66 MG/DL (ref 0.6–1.3)
GFR SERPL CREATININE-BSD FRML MDRD: 86 ML/MIN/1.73SQ M
GLUCOSE SERPL-MCNC: 120 MG/DL (ref 65–140)
POTASSIUM SERPL-SCNC: 3.2 MMOL/L (ref 3.5–5.3)
SODIUM SERPL-SCNC: 142 MMOL/L (ref 136–145)

## 2017-11-15 PROCEDURE — 80048 BASIC METABOLIC PNL TOTAL CA: CPT | Performed by: HOSPITALIST

## 2017-11-15 RX ORDER — ASPIRIN 325 MG
325 TABLET ORAL DAILY
Qty: 1 TABLET | Refills: 0 | Status: SHIPPED | OUTPATIENT
Start: 2017-11-16 | End: 2018-02-13 | Stop reason: ALTCHOICE

## 2017-11-15 RX ORDER — POTASSIUM CHLORIDE 20 MEQ/1
20 TABLET, EXTENDED RELEASE ORAL ONCE
Status: COMPLETED | OUTPATIENT
Start: 2017-11-15 | End: 2017-11-15

## 2017-11-15 RX ORDER — PRAVASTATIN SODIUM 10 MG
10 TABLET ORAL
Qty: 30 TABLET | Refills: 0 | Status: SHIPPED | OUTPATIENT
Start: 2017-11-15 | End: 2018-03-13 | Stop reason: SINTOL

## 2017-11-15 RX ORDER — CLOPIDOGREL BISULFATE 75 MG/1
75 TABLET ORAL DAILY
Qty: 30 TABLET | Refills: 0 | Status: SHIPPED | OUTPATIENT
Start: 2017-11-16 | End: 2018-02-13 | Stop reason: SDUPTHER

## 2017-11-15 RX ADMIN — CLOPIDOGREL BISULFATE 75 MG: 75 TABLET ORAL at 09:13

## 2017-11-15 RX ADMIN — PANTOPRAZOLE SODIUM 20 MG: 20 TABLET, DELAYED RELEASE ORAL at 05:32

## 2017-11-15 RX ADMIN — ENOXAPARIN SODIUM 40 MG: 40 INJECTION SUBCUTANEOUS at 09:13

## 2017-11-15 RX ADMIN — POTASSIUM CHLORIDE 20 MEQ: 1500 TABLET, EXTENDED RELEASE ORAL at 12:06

## 2017-11-15 RX ADMIN — ASPIRIN 325 MG: 325 TABLET ORAL at 09:13

## 2017-11-15 NOTE — DISCHARGE SUMMARY
Discharge Summary - Medical [place] 69 y o  female MRN: [id number]    [facility] [place] [address] MED SURG Room / Bed: [address] 434/E4 -* Encounter: [id number]    BRIEF OVERVIEW      Admission Date: 11/13/2017       Discharge Date:  11/15/2017    Admitting Diagnosis:  Acute onset right lower extremity weakness  Primary Discharge Diagnosis  Principal Problem:    Acute CVA (cerebrovascular accident) ([address] )  Acute left thalamic ischemic infarct  Active Problems:    HTN (hypertension)    HLD (hyperlipidemia)    Hypokalemia  Resolved Problems:    Weakness of right lower extremity    LUE numbness      Service:  [address] [place]ila    Procedures Performed     MRI brain-Approximately 1 cm acute infarction involving the left lateral thalamus without mass effect or hemorrhagic transformation      Mild cerebral volume loss and scattered white matter change consistent with chronic microangiopathy    MRA brain-IMPRESSION:     Scattered atherosclerotic disease involving the intracranial vasculature including mild to moderate focal narrowing of the left P2 segment of the posterior cerebral artery is patient with acute thalamic infarction      Additional areas of mild to moderate atherosclerotic narrowing narrowing include the right intracranial internal carotid artery, right A1 segment, left distal M1 segment and left distal vertebral artery  EchocardiogramSUMMARY     LEFT VENTRICLE:  Systolic function was hyperdynamic  Ejection fraction was estimated in the range of 70 % to 75 %  There were no regional wall motion abnormalities  Hypertrophy was noted  There was severe concentric hypertrophy  There is obstruction at rest in the mid cavity, with a peak gradient of 23 mmHg that is likely secondary to the hypderdynamic state    Doppler parameters were consistent with abnormal left ventricular relaxation (grade 1 diastolic dysfunction)      MITRAL VALVE:  There was mild to moderate annular calcification      TRICUSPID VALVE:  There was trace regurgitation      PULMONIC VALVE:  There was trace regurgitation  Assessment and plan on date of discharge  1-acute CVA presenting with right lower extremity weakness which has now  improved   MRI reveals acute left thalamic ischemic infarct with multiple areas of intracranial stenosis   Severe likely secondary to small versus thrombosis secondary to E uncontrolled hypertension     Plavix added to aspirin   Patient on Pravachol secondary to intolerance to statins   Echocardiogram reveals no evidence of any embolic source  EF of 75%  Carotid Dopplers less than 50% stenosis bilaterally      2-essential hypertension-will restart home regimen of blood pressure medications      3-hypokalemia-will continue to replete      4-dyslipidemia--patient intolerant of Zocor and atorvastatin   Low-dose Pravachol started   Will also try adding coenzyme Q10  Discharge Condition: Improved    Discharge Disposition: Home/Self Care    Discharge summary:   Rosa Moran is an extremely pleasant 60-year-old female who presented to [facility] on 11/13/2017 after waking up in the morning and noticing that her right leg was weak  Achieved was dragging her right leg when she attempted to walk  There was also tingling in the left upper arm  Her blood pressure on arrival to the ED was 226/100  Her symptoms improved the following admission  An MRI confirmed an acute left thalamic ischemic infarct  MRA revealed multiple areas of intracranial stenosis-her stroke was felt to be secondary to small vessel thrombosis in the setting of uncontrolled hypertension and the event occurred despite aspirin utilization  An aspirin sensitivity test revealed that she was sensitive to aspirin  The dose was increased to 325 mg and Plavix was added    The patient has been intolerant of statins in the past(Zocor and atorvastatin)-hence Pravachol at a low dose was started which she seems to tolerate well  Her A1c is 5 6  Lipid profile reveals cholesterol of 230, triglycerides 134, HDL 41 and LDL of 162  Coenzyme Q10 has also been added to her regimen  She remains stable for discharge and arrangements have been made for VNA as well as home PT  She plans to relocate to [place] in January of 2018  She will follow up with Neurology on discharge          Discharge Medications   Please see Medical Reconciliation Discharge Form    Discharge Follow Up Appointments:   PCP  [facility]    Discharge  Statement   Total Time Spent today including physical exam, discussion with patient and family, and discharge arrangements/care 42 minutes

## 2017-11-15 NOTE — DISCHARGE INSTRUCTIONS
Medications added    Plavix 75 mg daily  Coenzyme Q10  Pravachol 10 mg daily  Aspirin has been increased to 325 mg daily

## 2017-11-15 NOTE — CONSULTS
Consulted for stroke  Pt reported eating healthy at home, avoids high fat and high salt foods  Reinforced heart healthy diet ed with patient and showed her where to locate nutrition information in stroke binder  Adjusted diet to step 1 cardiac

## 2017-11-15 NOTE — PLAN OF CARE
Problem: Potential for Falls  Goal: Patient will remain free of falls  INTERVENTIONS:  - Assess patient frequently for physical needs  -  Identify cognitive and physical deficits and behaviors that affect risk of falls  -  [place] fall precautions as indicated by assessment   - Educate patient/family on patient safety including physical limitations  - Instruct patient to call for assistance with activity based on assessment  - Modify environment to reduce risk of injury  - Consider OT/PT consult to assist with strengthening/mobility   Outcome: Progressing      Problem: PAIN - ADULT  Goal: Verbalizes/displays adequate comfort level or baseline comfort level  Interventions:  - Encourage patient to monitor pain and request assistance  - Assess pain using appropriate pain scale  - Administer analgesics based on type and severity of pain and evaluate response  - Implement non-pharmacological measures as appropriate and evaluate response  - Consider cultural and social influences on pain and pain management  - Notify physician/advanced practitioner if interventions unsuccessful or patient reports new pain   Outcome: Progressing      Problem: Knowledge Deficit  Goal: Patient/family/caregiver demonstrates understanding of disease process, treatment plan, medications, and discharge instructions  Complete learning assessment and assess knowledge base  Interventions:  - Provide teaching at level of understanding  - Provide teaching via preferred learning methods   Outcome: Progressing      Problem: Neurological Deficit  Goal: Neurological status is stable or improving  Interventions:  - Monitor and assess patient's level of consciousness, motor function, sensory function, and level of assistance needed for ADLs  - Monitor and report changes from baseline  Collaborate with interdisciplinary team to initiate plan and implement interventions as ordered  - Provide and maintain a safe environment    - Utilize seizure precautions  - Utilize fall precautions  - Utilize aspiration precautions  - Utilize bleeding precautions  Outcome: Progressing      Problem: Activity Intolerance/Impaired Mobility  Goal: Mobility/activity is maintained at optimum level for patient  Interventions:  - Assess and monitor patient  barriers to mobility and need for assistive/adaptive devices  - Assess patient's emotional response to limitations  - Collaborate with interdisciplinary team and initiate plans and interventions as ordered  - Encourage independent activity per ability   - Maintain proper body alignment  - Perform active/passive rom as tolerated/ordered  - Plan activities to conserve energy   - Turn patient   Outcome: Progressing      Problem: Communication Impairment  Goal: Ability to express needs and understand communication  Assess patient's communication skills and ability to understand information  Patient will demonstrate use of effective communication techniques, alternative methods of communication and understanding even if not able to speak  - Encourage communication and provide alternate methods of communication as needed  - Collaborate with case management/ for discharge needs  - Include patient/family/caregiver in decisions related to communication  Outcome: Progressing      Problem: Potential for Aspiration  Goal: Non-ventilated patient's risk of aspiration is minimized  Assess and monitor vital signs, respiratory status, and labs (WBC)  Monitor for signs of aspiration (tachypnea, cough, rales, wheezing, cyanosis, fever)  - Assess and monitor patient's ability to swallow  - Place patient up in chair to eat if possible  - HOB up at 90 degrees to eat if unable to get patient up into chair   - Supervise patient during oral intake  - Instruct patient to take small bites  - Instruct patient to take small single sips when taking liquids    - Follow patient-specific strategies generated by speech pathologist    Outcome: Progressing    Goal: Ventilated patient's risk of aspiration is minimized  Assess and monitor vital signs, respiratory status, airway cuff pressure, and labs (WBC)  Monitor for signs of aspiration (tachypnea, cough, rales, wheezing, cyanosis, fever)  - Elevate head of bed 30 degrees if patient has tube feeding   - Monitor tube feeding  Outcome: Progressing      Problem: Nutrition  Goal: Nutrition/Hydration status is improving  Monitor and assess patient's nutrition/hydration status for malnutrition (ex- brittle hair, bruises, dry skin, pale skin and conjunctiva, muscle wasting, smooth red tongue, and disorientation)  Collaborate with interdisciplinary team and initiate plan and interventions as ordered  Monitor patient's weight and dietary intake as ordered or per policy  Utilize nutrition screening tool and intervene per policy  Determine patient's food preferences and provide high-protein, high-caloric foods as appropriate  - Assist patient with eating   - Allow adequate time for meals   - Encourage patient to take dietary supplement as ordered  - Collaborate with clinical nutritionist   - Include patient/family/caregiver in decisions related to nutrition     Outcome: Progressing

## 2017-11-15 NOTE — PLAN OF CARE
Problem: Potential for Falls  Goal: Patient will remain free of falls  INTERVENTIONS:  - Assess patient frequently for physical needs  -  Identify cognitive and physical deficits and behaviors that affect risk of falls  -  [place] fall precautions as indicated by assessment   - Educate patient/family on patient safety including physical limitations  - Instruct patient to call for assistance with activity based on assessment  - Modify environment to reduce risk of injury  - Consider OT/PT consult to assist with strengthening/mobility   Outcome: Adequate for Discharge      Problem: PAIN - ADULT  Goal: Verbalizes/displays adequate comfort level or baseline comfort level  Interventions:  - Encourage patient to monitor pain and request assistance  - Assess pain using appropriate pain scale  - Administer analgesics based on type and severity of pain and evaluate response  - Implement non-pharmacological measures as appropriate and evaluate response  - Consider cultural and social influences on pain and pain management  - Notify physician/advanced practitioner if interventions unsuccessful or patient reports new pain   Outcome: Adequate for Discharge      Problem: Knowledge Deficit  Goal: Patient/family/caregiver demonstrates understanding of disease process, treatment plan, medications, and discharge instructions  Complete learning assessment and assess knowledge base  Interventions:  - Provide teaching at level of understanding  - Provide teaching via preferred learning methods   Outcome: Adequate for Discharge      Problem: Neurological Deficit  Goal: Neurological status is stable or improving  Interventions:  - Monitor and assess patient's level of consciousness, motor function, sensory function, and level of assistance needed for ADLs  - Monitor and report changes from baseline  Collaborate with interdisciplinary team to initiate plan and implement interventions as ordered     - Provide and maintain a safe environment  - Utilize seizure precautions  - Utilize fall precautions  - Utilize aspiration precautions  - Utilize bleeding precautions  Outcome: Adequate for Discharge      Problem: Activity Intolerance/Impaired Mobility  Goal: Mobility/activity is maintained at optimum level for patient  Interventions:  - Assess and monitor patient  barriers to mobility and need for assistive/adaptive devices  - Assess patient's emotional response to limitations  - Collaborate with interdisciplinary team and initiate plans and interventions as ordered  - Encourage independent activity per ability   - Maintain proper body alignment  - Perform active/passive rom as tolerated/ordered  - Plan activities to conserve energy   - Turn patient   Outcome: Adequate for Discharge      Problem: Communication Impairment  Goal: Ability to express needs and understand communication  Assess patient's communication skills and ability to understand information  Patient will demonstrate use of effective communication techniques, alternative methods of communication and understanding even if not able to speak  - Encourage communication and provide alternate methods of communication as needed  - Collaborate with case management/ for discharge needs  - Include patient/family/caregiver in decisions related to communication  Outcome: Adequate for Discharge      Problem: Potential for Aspiration  Goal: Non-ventilated patient's risk of aspiration is minimized  Assess and monitor vital signs, respiratory status, and labs (WBC)  Monitor for signs of aspiration (tachypnea, cough, rales, wheezing, cyanosis, fever)  - Assess and monitor patient's ability to swallow  - Place patient up in chair to eat if possible  - HOB up at 90 degrees to eat if unable to get patient up into chair   - Supervise patient during oral intake  - Instruct patient to take small bites    - Instruct patient to take small single sips when taking liquids  - Follow patient-specific strategies generated by speech pathologist    Outcome: Adequate for Discharge    Goal: Ventilated patient's risk of aspiration is minimized  Assess and monitor vital signs, respiratory status, airway cuff pressure, and labs (WBC)  Monitor for signs of aspiration (tachypnea, cough, rales, wheezing, cyanosis, fever)  - Elevate head of bed 30 degrees if patient has tube feeding   - Monitor tube feeding  Outcome: Adequate for Discharge      Problem: Nutrition  Goal: Nutrition/Hydration status is improving  Monitor and assess patient's nutrition/hydration status for malnutrition (ex- brittle hair, bruises, dry skin, pale skin and conjunctiva, muscle wasting, smooth red tongue, and disorientation)  Collaborate with interdisciplinary team and initiate plan and interventions as ordered  Monitor patient's weight and dietary intake as ordered or per policy  Utilize nutrition screening tool and intervene per policy  Determine patient's food preferences and provide high-protein, high-caloric foods as appropriate  - Assist patient with eating   - Allow adequate time for meals   - Encourage patient to take dietary supplement as ordered  - Collaborate with clinical nutritionist   - Include patient/family/caregiver in decisions related to nutrition     Outcome: Adequate for Discharge

## 2017-11-15 NOTE — PROGRESS NOTES
Progress Note - [place] 69 y o  female MRN: [id number]    Unit/Bed#: E4 -01 Encounter: [id number]      Assessment/Plan:  1-acute CVA presenting with right lower extremity weakness which has now  improved  MRI reveals acute left thalamic ischemic infarct with multiple areas of intracranial stenosis  Severe likely secondary to small versus thrombosis secondary to E uncontrolled hypertension     Plavix added to aspirin  Patient on Pravachol secondary to intolerance to statins  Echocardiogram reveals no evidence of any embolic source  EF of 75%  Carotid Dopplers less than 50% stenosis bilaterally      2-essential hypertension-will restart home regimen of blood pressure medications      3-hypokalemia-will continue to replete      4-dyslipidemia--patient intolerant of Zocor and atorvastatin  Low-dose Pravachol started  Will also try adding coenzyme Q10      5-ERCA-brnpcujl PPI     Likely DC home this afternoon  With VNA    Subjective:  Patient doing well  Echocardiogram without evidence of any embolic source  EFof 75%  Carotid Dopplers less than 50% stenosis  Aspirin sensitivity within therapeutic range  Patient eager to be discharged home  Will arrange for VNA and home PT  Physical Exam:   Vitals: Blood pressure 169/95, pulse 78, temperature (!) 97 3 °F (36 3 °C), temperature source Temporal, resp  rate 18, weight 86 2 kg (190 lb), SpO2 96 %  ,There is no height or weight on file to calculate BMI  Gen:  Pleasant, non-tachypnic, non-dyspnic  Conversant  Heart: regular rate and rhythm, S1S2 present, no murmur, rub or gallop  Lungs: clear to ausculatation bilaterally  No wheezing, crackless, or rhonchi  No accessory muscle use or respiratory distress  Abd: soft, non-tender, non-distended  NABS, no guarding, rebound or peritoneal signs  Extremities: no clubbing, cyanosis or edema  2+pedal pulses bilaterally  Full range of motion  Neuro: awake, alert and oriented   Cranial nerves 2-12 intact  Strength and sensation grossly intact  Skin: warm and dry: no petechiae, purpura and rash      LABS:     Results from last 7 days  Lab Units 11/13/17  1019   WBC Thousand/uL 8 75   HEMOGLOBIN g/dL 14 7   HEMATOCRIT % 41 9   PLATELETS Thousands/uL 260       Results from last 7 days  Lab Units 11/15/17  0537 11/14/17  0533 11/13/17  1019   SODIUM mmol/L 142 143 142   POTASSIUM mmol/L 3 2* 3 0* 2 6*   CHLORIDE mmol/L 107 106 100   CO2 mmol/L 27 29 32   BUN mg/dL 16 14 13   CREATININE mg/dL 0 66 0 69 0 69   GLUCOSE RANDOM mg/dL 120 119 144*   CALCIUM mg/dL 8 2* 8 2* 8 7       Intake/Output Summary (Last 24 hours) at 11/15/17 1042  Last data filed at 11/15/17 0854   Gross per 24 hour   Intake              540 ml   Output                0 ml   Net              540 ml           aspirin 325 mg Oral Daily   clopidogrel 75 mg Oral Daily   enoxaparin 40 mg Subcutaneous Daily   pantoprazole 20 mg Oral Early Morning   potassium chloride 20 mEq Oral Once   pravastatin 10 mg Oral Daily With Dinner       Family update- son in the room-updated

## 2017-11-15 NOTE — PLAN OF CARE
Problem: Neurological Deficit  Goal: Neurological status is stable or improving  Interventions:  - Monitor and assess patient's level of consciousness, motor function, sensory function, and level of assistance needed for ADLs  - Monitor and report changes from baseline  Collaborate with interdisciplinary team to initiate plan and implement interventions as ordered  - Provide and maintain a safe environment  - Utilize seizure precautions  - Utilize fall precautions  - Utilize aspiration precautions  - Utilize bleeding precautions  Outcome: Progressing      Problem: Activity Intolerance/Impaired Mobility  Goal: Mobility/activity is maintained at optimum level for patient  Interventions:  - Assess and monitor patient  barriers to mobility and need for assistive/adaptive devices  - Assess patient's emotional response to limitations  - Collaborate with interdisciplinary team and initiate plans and interventions as ordered  - Encourage independent activity per ability   - Maintain proper body alignment  - Perform active/passive rom as tolerated/ordered  - Plan activities to conserve energy   - Turn patient   Outcome: Progressing      Problem: Communication Impairment  Goal: Ability to express needs and understand communication  Assess patient's communication skills and ability to understand information  Patient will demonstrate use of effective communication techniques, alternative methods of communication and understanding even if not able to speak  - Encourage communication and provide alternate methods of communication as needed  - Collaborate with case management/ for discharge needs  - Include patient/family/caregiver in decisions related to communication  Outcome: Progressing      Problem: Potential for Aspiration  Goal: Non-ventilated patient's risk of aspiration is minimized  Assess and monitor vital signs, respiratory status, and labs (WBC)    Monitor for signs of aspiration (tachypnea, cough, rales, wheezing, cyanosis, fever)  - Assess and monitor patient's ability to swallow  - Place patient up in chair to eat if possible  - HOB up at 90 degrees to eat if unable to get patient up into chair   - Supervise patient during oral intake  - Instruct patient to take small bites  - Instruct patient to take small single sips when taking liquids  - Follow patient-specific strategies generated by speech pathologist    Outcome: Progressing    Goal: Ventilated patient's risk of aspiration is minimized  Assess and monitor vital signs, respiratory status, airway cuff pressure, and labs (WBC)  Monitor for signs of aspiration (tachypnea, cough, rales, wheezing, cyanosis, fever)  - Elevate head of bed 30 degrees if patient has tube feeding   - Monitor tube feeding  Outcome: Progressing      Problem: Nutrition  Goal: Nutrition/Hydration status is improving  Monitor and assess patient's nutrition/hydration status for malnutrition (ex- brittle hair, bruises, dry skin, pale skin and conjunctiva, muscle wasting, smooth red tongue, and disorientation)  Collaborate with interdisciplinary team and initiate plan and interventions as ordered  Monitor patient's weight and dietary intake as ordered or per policy  Utilize nutrition screening tool and intervene per policy  Determine patient's food preferences and provide high-protein, high-caloric foods as appropriate  - Assist patient with eating   - Allow adequate time for meals   - Encourage patient to take dietary supplement as ordered  - Collaborate with clinical nutritionist   - Include patient/family/caregiver in decisions related to nutrition     Outcome: Progressing

## 2017-11-20 ENCOUNTER — ALLSCRIPTS OFFICE VISIT (OUTPATIENT)
Dept: OTHER | Facility: OTHER | Age: 77
End: 2017-11-20

## 2017-11-20 DIAGNOSIS — E87.6 HYPOKALEMIA: ICD-10-CM

## 2017-11-22 ENCOUNTER — GENERIC CONVERSION - ENCOUNTER (OUTPATIENT)
Dept: OTHER | Facility: OTHER | Age: 77
End: 2017-11-22

## 2017-11-22 LAB — POTASSIUM SERPL-SCNC: 4.5 MMOL/L (ref 3.5–5.3)

## 2017-12-03 ENCOUNTER — HOSPITAL ENCOUNTER (EMERGENCY)
Facility: HOSPITAL | Age: 77
Discharge: HOME | End: 2017-12-03
Attending: EMERGENCY MEDICINE | Admitting: EMERGENCY MEDICINE
Payer: COMMERCIAL

## 2017-12-03 VITALS
RESPIRATION RATE: 20 BRPM | WEIGHT: 190 LBS | TEMPERATURE: 98.6 F | OXYGEN SATURATION: 97 % | HEART RATE: 80 BPM | DIASTOLIC BLOOD PRESSURE: 84 MMHG | SYSTOLIC BLOOD PRESSURE: 178 MMHG

## 2017-12-03 DIAGNOSIS — I10 ELEVATED SYSTOLIC BLOOD PRESSURE READING WITH DIAGNOSIS OF HYPERTENSION: Primary | ICD-10-CM

## 2017-12-03 LAB
ALBUMIN SERPL BCP-MCNC: 3.9 G/DL (ref 3.5–5)
ALP SERPL-CCNC: 88 U/L (ref 46–116)
ALT SERPL W P-5'-P-CCNC: 38 U/L (ref 12–78)
ANION GAP SERPL CALCULATED.3IONS-SCNC: 8 MMOL/L (ref 4–13)
AST SERPL W P-5'-P-CCNC: 21 U/L (ref 5–45)
ATRIAL RATE: 89 BPM
BASOPHILS # BLD AUTO: 0.02 THOUSANDS/ΜL (ref 0–0.1)
BASOPHILS NFR BLD AUTO: 0 % (ref 0–1)
BILIRUB SERPL-MCNC: 0.53 MG/DL (ref 0.2–1)
BUN SERPL-MCNC: 16 MG/DL (ref 5–25)
CALCIUM SERPL-MCNC: 9.6 MG/DL (ref 8.3–10.1)
CHLORIDE SERPL-SCNC: 99 MMOL/L (ref 100–108)
CO2 SERPL-SCNC: 31 MMOL/L (ref 21–32)
CREAT SERPL-MCNC: 0.71 MG/DL (ref 0.6–1.3)
EOSINOPHIL # BLD AUTO: 0.22 THOUSAND/ΜL (ref 0–0.61)
EOSINOPHIL NFR BLD AUTO: 3 % (ref 0–6)
ERYTHROCYTE [DISTWIDTH] IN BLOOD BY AUTOMATED COUNT: 12.4 % (ref 11.6–15.1)
GFR SERPL CREATININE-BSD FRML MDRD: 83 ML/MIN/1.73SQ M
GLUCOSE SERPL-MCNC: 106 MG/DL (ref 65–140)
HCT VFR BLD AUTO: 41.6 % (ref 34.8–46.1)
HGB BLD-MCNC: 14.6 G/DL (ref 11.5–15.4)
LYMPHOCYTES # BLD AUTO: 2 THOUSANDS/ΜL (ref 0.6–4.47)
LYMPHOCYTES NFR BLD AUTO: 23 % (ref 14–44)
MCH RBC QN AUTO: 30.7 PG (ref 26.8–34.3)
MCHC RBC AUTO-ENTMCNC: 35.1 G/DL (ref 31.4–37.4)
MCV RBC AUTO: 88 FL (ref 82–98)
MONOCYTES # BLD AUTO: 0.83 THOUSAND/ΜL (ref 0.17–1.22)
MONOCYTES NFR BLD AUTO: 10 % (ref 4–12)
NEUTROPHILS # BLD AUTO: 5.69 THOUSANDS/ΜL (ref 1.85–7.62)
NEUTS SEG NFR BLD AUTO: 64 % (ref 43–75)
NRBC BLD AUTO-RTO: 0 /100 WBCS
P AXIS: 41 DEGREES
PLATELET # BLD AUTO: 262 THOUSANDS/UL (ref 149–390)
PMV BLD AUTO: 11 FL (ref 8.9–12.7)
POTASSIUM SERPL-SCNC: 3.1 MMOL/L (ref 3.5–5.3)
PR INTERVAL: 144 MS
PROT SERPL-MCNC: 7.7 G/DL (ref 6.4–8.2)
QRS AXIS: 46 DEGREES
QRSD INTERVAL: 70 MS
QT INTERVAL: 388 MS
QTC INTERVAL: 472 MS
RBC # BLD AUTO: 4.75 MILLION/UL (ref 3.81–5.12)
SODIUM SERPL-SCNC: 138 MMOL/L (ref 136–145)
T WAVE AXIS: 77 DEGREES
VENTRICULAR RATE: 89 BPM
WBC # BLD AUTO: 8.76 THOUSAND/UL (ref 4.31–10.16)

## 2017-12-03 PROCEDURE — 99284 EMERGENCY DEPT VISIT MOD MDM: CPT

## 2017-12-03 PROCEDURE — 36415 COLL VENOUS BLD VENIPUNCTURE: CPT | Performed by: EMERGENCY MEDICINE

## 2017-12-03 PROCEDURE — 93005 ELECTROCARDIOGRAM TRACING: CPT | Performed by: EMERGENCY MEDICINE

## 2017-12-03 PROCEDURE — 85025 COMPLETE CBC W/AUTO DIFF WBC: CPT | Performed by: EMERGENCY MEDICINE

## 2017-12-03 PROCEDURE — 93005 ELECTROCARDIOGRAM TRACING: CPT

## 2017-12-03 PROCEDURE — 80053 COMPREHEN METABOLIC PANEL: CPT | Performed by: EMERGENCY MEDICINE

## 2017-12-03 RX ORDER — POTASSIUM CHLORIDE 20 MEQ/1
40 TABLET, EXTENDED RELEASE ORAL ONCE
Status: COMPLETED | OUTPATIENT
Start: 2017-12-03 | End: 2017-12-03

## 2017-12-03 RX ADMIN — POTASSIUM CHLORIDE 40 MEQ: 1500 TABLET, EXTENDED RELEASE ORAL at 14:23

## 2017-12-03 NOTE — ED PROVIDER NOTES
History  Chief Complaint   Patient presents with    Hypertension     Visiting RN took blood pressure this morning at 7961 with systolic reading of 924; told to come to ED  Also reports lightheadedness  68 yo female referred to ED by home health nurse who was taking BP today and was concerned about elevated SBP in the 180s  Patient at the time was not experiencing any acute symptoms  She was recently admitted for CVA, discharged 11/16/17 with mild residual right leg weakness, but has been very functional at home  Three days ago her BP was 200s, but also asymptomatic, so her home medication was increased  Currently she feels normal           History provided by:  Patient      Prior to Admission Medications   Prescriptions Last Dose Informant Patient Reported? Taking? Omeprazole Magnesium (PRILOSEC OTC PO)   Yes Yes   Sig: Take by mouth daily Indications: unsure of dose  amLODIPine (NORVASC) 5 mg tablet 12/3/2017 at Unknown time  Yes Yes   Sig: Take 10 mg by mouth 2 (two) times a day     aspirin 325 mg tablet 12/3/2017 at Unknown time  No Yes   Sig: Take 1 tablet by mouth daily   clopidogrel (PLAVIX) 75 mg tablet 12/3/2017 at Unknown time  No Yes   Sig: Take 1 tablet by mouth daily   pravastatin (PRAVACHOL) 10 mg tablet   No Yes   Sig: Take 1 tablet by mouth daily with dinner      Facility-Administered Medications: None       Past Medical History:   Diagnosis Date    Allergic rhinitis     GERD (gastroesophageal reflux disease)     Headache     Hyperlipidemia     Hypertension        Past Surgical History:   Procedure Laterality Date    APPENDECTOMY         No family history on file  I have reviewed and agree with the history as documented  Social History   Substance Use Topics    Smoking status: Former Smoker    Smokeless tobacco: Never Used    Alcohol use No        Review of Systems   Constitutional: Negative for appetite change, chills and fever  HENT: Negative for sore throat  Respiratory: Negative for cough, shortness of breath and wheezing  Cardiovascular: Negative for chest pain and palpitations  Gastrointestinal: Negative for abdominal pain, diarrhea, nausea and vomiting  Genitourinary: Negative for dysuria and hematuria  Musculoskeletal: Negative for neck pain  Skin: Negative for rash  Neurological: Negative for dizziness, weakness and headaches  Psychiatric/Behavioral: Negative for suicidal ideas  All other systems reviewed and are negative  Physical Exam  ED Triage Vitals   Temperature Pulse Respirations Blood Pressure SpO2   12/03/17 1153 12/03/17 1153 12/03/17 1153 12/03/17 1153 12/03/17 1153   98 6 °F (37 °C) 96 18 141/73 97 %      Temp Source Heart Rate Source Patient Position - Orthostatic VS BP Location FiO2 (%)   12/03/17 1153 12/03/17 1250 12/03/17 1250 12/03/17 1250 --   Temporal Monitor Sitting Right arm       Pain Score       12/03/17 1153       No Pain           Orthostatic Vital Signs  Vitals:    12/03/17 1153 12/03/17 1250 12/03/17 1251 12/03/17 1415   BP: 141/73 163/74 164/73 (!) 178/84   Pulse: 96 85 84 80   Patient Position - Orthostatic VS:  Sitting Sitting        Physical Exam   Constitutional: She is oriented to person, place, and time  Vital signs are normal  She appears well-developed and well-nourished  Non-toxic appearance  HENT:   Head: Normocephalic and atraumatic  Right Ear: Tympanic membrane and external ear normal    Left Ear: Tympanic membrane and external ear normal    Nose: Nose normal    Mouth/Throat: Oropharynx is clear and moist    Eyes: Conjunctivae and EOM are normal  Pupils are equal, round, and reactive to light  Neck: Normal range of motion and full passive range of motion without pain  Neck supple  No Brudzinski's sign and no Kernig's sign noted  Cardiovascular: Normal rate, regular rhythm, normal heart sounds, intact distal pulses and normal pulses  No murmur heard    Pulmonary/Chest: Effort normal and breath sounds normal  No tachypnea  No respiratory distress  She has no wheezes  Abdominal: Soft  Bowel sounds are normal  She exhibits no distension  There is no tenderness  There is no rigidity, no rebound and no guarding  Musculoskeletal: Normal range of motion  Right lower leg: She exhibits no swelling  Left lower leg: She exhibits no swelling  Lymphadenopathy:     She has no cervical adenopathy  Neurological: She is alert and oriented to person, place, and time  She has normal strength and normal reflexes  No cranial nerve deficit or sensory deficit  Coordination and gait normal  GCS eye subscore is 4  GCS verbal subscore is 5  GCS motor subscore is 6  Skin: Skin is warm and dry  No rash noted  She is not diaphoretic  No pallor  Psychiatric: She has a normal mood and affect  Her speech is normal and behavior is normal  Judgment and thought content normal  Cognition and memory are normal    Nursing note and vitals reviewed        ED Medications  Medications   potassium chloride (K-DUR,KLOR-CON) CR tablet 40 mEq (40 mEq Oral Given 12/3/17 1423)       Diagnostic Studies  Results Reviewed     Procedure Component Value Units Date/Time    Comprehensive metabolic panel [[id number]]  (Abnormal) Collected:  12/03/17 1300    Lab Status:  Final result Specimen:  Blood from Arm, Right Updated:  12/03/17 1326     Sodium 138 mmol/L      Potassium 3 1 (L) mmol/L      Chloride 99 (L) mmol/L      CO2 31 mmol/L      Anion Gap 8 mmol/L      BUN 16 mg/dL      Creatinine 0 71 mg/dL      Glucose 106 mg/dL      Calcium 9 6 mg/dL      AST 21 U/L      ALT 38 U/L      Alkaline Phosphatase 88 U/L      Total Protein 7 7 g/dL      Albumin 3 9 g/dL      Total Bilirubin 0 53 mg/dL      eGFR 83 ml/min/1 73sq m     Narrative:         National Kidney Disease Education Program recommendations are as follows:  GFR calculation is accurate only with a steady state creatinine  Chronic Kidney disease less than 60 ml/min/1 73 sq  meters  Kidney failure less than 15 ml/min/1 73 sq  meters  CBC and differential [[id number]]  (Normal) Collected:  12/03/17 1300    Lab Status:  Final result Specimen:  Blood from Arm, Right Updated:  12/03/17 1308     WBC 8 76 Thousand/uL      RBC 4 75 Million/uL      Hemoglobin 14 6 g/dL      Hematocrit 41 6 %      MCV 88 fL      MCH 30 7 pg      MCHC 35 1 g/dL      RDW 12 4 %      MPV 11 0 fL      Platelets 049 Thousands/uL      nRBC 0 /100 WBCs      Neutrophils Relative 64 %      Lymphocytes Relative 23 %      Monocytes Relative 10 %      Eosinophils Relative 3 %      Basophils Relative 0 %      Neutrophils Absolute 5 69 Thousands/µL      Lymphocytes Absolute 2 00 Thousands/µL      Monocytes Absolute 0 83 Thousand/µL      Eosinophils Absolute 0 22 Thousand/µL      Basophils Absolute 0 02 Thousands/µL                  No orders to display              Procedures  ECG 12 Lead Documentation  Date/Time: 12/3/2017 12:11 PM  Performed by: Megan Cervantes  Authorized by: Megan Ran              Phone Contacts  ED Phone Contact    ED Course  ED Course as of Dec 03 1433   Sun Dec 03, 2017   1426 BP improved in the ED from her home reading, without acute symptoms  Her PCP has recently increased her home meds, and is considering sending her to renal                                 MDM  CritCare Time    Disposition  Final diagnoses:   Elevated systolic blood pressure reading with diagnosis of hypertension     Time reflects when diagnosis was documented in both MDM as applicable and the Disposition within this note     Time User Action Codes Description Comment    12/3/2017  2:32 PM Raymond TOVAR Add [Q87] Elevated systolic blood pressure reading with diagnosis of hypertension       ED Disposition     ED Disposition Condition Comment    Discharge  Calvin Clonts discharge to home/self care      Condition at discharge: Good        Follow-up Information     Follow up With Specialties Details Why Contact Lou Tolentino,  Family Medicine Call For followup [address]  [phone]          Patient's Medications   Discharge Prescriptions    No medications on file     No discharge procedures on file      ED Provider  Electronically Signed by           Alber Kaufman MD  12/03/17 1187

## 2017-12-03 NOTE — DISCHARGE INSTRUCTIONS
Follow up with your regular doctor with referral to nephrology (kidney) specialist as planned  Return if you are experiencing new or worsening shortness of breath, chest pain, weakness, severe headache

## 2017-12-15 ENCOUNTER — GENERIC CONVERSION - ENCOUNTER (OUTPATIENT)
Dept: OTHER | Facility: OTHER | Age: 77
End: 2017-12-15

## 2018-01-10 NOTE — RESULT NOTES
Verified Results  (1) LIPID PANEL, FASTING [id number] 10:26AM Orlando Mad   REPORT COMMENT:  FASTING:YES     Test Name Result Flag Reference   CHOLESTEROL, TOTAL 282 mg/dL H <200   HDL CHOLESTEROL 48 mg/dL L >31   TRIGLICERIDES 762 mg/dL H <150   LDL-CHOLESTEROL 196 mg/dL (calc) H    LDL-C levels > or = 190 mg/dL may indicate familial   hypercholesterolemia (FH)  Clinical assessment and   measurement of blood lipid levels should be considered  for all first degree relatives of patients with an   FH diagnosis  Santana Lau et al  J National Lipid   Association Recommendations for Patient-Centered   Management of Dyslipidemia: Part 1 Journal of Clinical   Lipidology 2015;9(2), 129-169  Reference range: <100     Desirable range <100 mg/dL for patients with CHD or  diabetes and <70 mg/dL for diabetic patients with  known heart disease  The Igor-Abarca calculation is a validated novel   method that provides better accuracy than the   Friedewald equation in the estimation of LDL-C,   particularly when TG levels are 150-400 mg/dL and   LDL-C levels are lower than 70 mg/dL  Reference:  Justine Weiss al  Comparison of a Novel   Method vs the Friedewald Equation for Estimating   Low-Density Lipoprotein Cholesterol Levels From the   Standard Lipid Profile  DARLEEN  3696;560(21): 0254-8563  For additional information, please refer to  http://[employer]/faq/JQW768  (This link is being provided for informational/  educational purposes only )   CHOL/HDLC RATIO 5 9 (calc) H <5 0   NON HDL CHOLESTEROL 234 mg/dL (calc) H <130   Non-HDL level > or = 220 is very high and may indicate   genetic familial hypercholesterolemia (FH)  Clinical   assessment and measurement of blood lipid levels   should be considered for all first-degree relatives   of patients with an FH diagnosis       For patients with diabetes plus 1 major ASCVD risk   factor, treating to a non-HDL-C goal of <100 mg/dL   (LDL-C of <70 mg/dL) is considered a therapeutic   option

## 2018-01-11 ENCOUNTER — ALLSCRIPTS OFFICE VISIT (OUTPATIENT)
Dept: OTHER | Facility: OTHER | Age: 78
End: 2018-01-11

## 2018-01-11 ENCOUNTER — GENERIC CONVERSION - ENCOUNTER (OUTPATIENT)
Dept: OTHER | Facility: OTHER | Age: 78
End: 2018-01-11

## 2018-01-11 NOTE — MISCELLANEOUS
Assessment    1  Acute thalamic infarction (434 91) (I63 9)   2  Hypertension (401 9) (I10)   3  Hyperlipidemia (272 4) (E78 5)   4  Hypokalemia (276 8) (E87 6)    Plan  Acute thalamic infarction    · From  Aspirin 81 MG Oral Tablet Delayed Release Take 1 daily To EQ Aspirin  325 MG Oral Tablet Delayed Release Take 1 tablet daily   Rx By: Junior Hung; Dispense: 0 Days ; #:30 Tablet Delayed Release; Refill: 0; For: Acute thalamic infarction; RODRIGO = N; Record  Hypertension    · AmLODIPine Besylate 5 MG Oral Tablet   Rx By: Junior Hung; Dispense: 90 Days ; #:90 Tablet; Refill: 1; For: Hypertension; RODRIGO = N; Verified Transmission to [facility]   · AmLODIPine Besylate 10 MG Oral Tablet; TAKE 1 TABLET DAILY AS DIRECTED   Rx By: Junior Hung; Dispense: 30 Days ; #:30 Tablet; Refill: 5; For: Hypertension; RODRIGO = N; Sent To: [facility]   · Follow-up visit in 2 weeks Evaluation and Treatment  Follow-up  Status: Hold For -  Scheduling  Requested for: [id number]   Ordered; For: Hypertension; Ordered By: Junior Hung Performed:  Due: [id number]  Hypokalemia    · (1) POTASSIUM; Status:Active; Requested for:[id number];    Perform:[facility]; OWT:[id number]; Ordered;  For:Hypokalemia; Ordered By:Tami Hernández;    Discussion/Summary  Discussion Summary:   Patient will need to have repeat potassium level today Patient will increase the norvasc to 10 mg daily and have recheck in 2 weeks She has a rash with Co enzyme q 10 so will stop that Patient will have repeat lipids panel in January  Chief Complaint  Chief Complaint Free Text Note Form: Here for STEPH visit      History of Present Illness  TCM Communication [facility]: The patient is being contacted for follow-up after hospitalization  Hospital records were reviewed  She was hospitalized at [facility]  The date of admission: 11/13/17, date of discharge: 11/15/17  Diagnosis: acute CVA  She was discharged to home  She scheduled a follow up appointment  Symptoms: fatigue  Counseling was provided to the patient  Communication performed and completed by Cally Archuleta   HPI: Patient is here for followup of her hospitalization for acute left thalamic CVA with elevated blood pressure and also her hyperlipidemia and low potassium Patient was discharged home on plavix and pravastatin with co enzyme 10 Patient is taking the co enzyme 10 every other day and she gets hives when she takes it Patient is tolerating the the pravachol Patient is fatigued and has had elevated BP since then      Review of Systems  Complete-Female:   Constitutional: No fever, no chills, feels well, no tiredness, no recent weight gain or weight loss  Eyes: no eye pain and no eyesight problems  ENT: no complaints of earache, no loss of hearing, no nose bleeds, no nasal discharge, no sore throat, no hoarseness  Cardiovascular: as noted in HPI  Respiratory: as noted in HPI  Gastrointestinal: No complaints of abdominal pain, no constipation, no nausea or vomiting, no diarrhea, no bloody stools  Genitourinary: no dysuria and no incontinence  Musculoskeletal: no arthralgias and no myalgias  Integumentary: no rashes  Neurological: No complaints of headache, no confusion, no convulsions, no numbness, no dizziness or fainting, no tingling, no limb weakness, no difficulty walking  Psychiatric: no anxiety, no sleep disturbances and no depression  Active Problems    1  Allergic rhinitis (477 9) (J30 9)   2  Fecal occult blood test positive (792 1) (R19 5)   3  GERD without esophagitis (530 81) (K21 9)   4  Hyperlipidemia (272 4) (E78 5)   5  Hypertension (401 9) (I10)   6  Medication therapy changed (V58 69) (Z79 899)   7  Sciatica of right side (724 3) (M54 31)   8  Vision disturbance (368 9) (H53 9)    Past Medical History    1  History of Abdominal bloating (787 3) (R14 0)   2  History of Adverse Effect Of Drug Therapy (995 20)   3   History of Arthralgia (719 40) (M25 50)   4  Cough (786 2) (R05)   5  Cough (786 2) (R05)   6  History of Dysuria (788 1) (R30 0)   7  History of acute sinusitis (V12 69) (Z87 09)   8  History of acute sinusitis (V12 69) (Z87 09)   9  History of left flank pain (V13 89) (Z87 898)   10  History of urinary frequency (V13 09) (Z87 898)   11  History of urinary tract infection (V13 02) (Z87 440)   12  History of Medicare annual wellness visit, initial (V70 0) (Z00 00)   13  History of Muscle pain (729 1) (M79 1)   14  History of Subclinical hypothyroidism (244 8) (E03 9)   15  History of Urgency of urination (788 63) (R39 15)    Surgical History    1  History of Appendectomy   2  History of Appendectomy  Surgical History Reviewed: The surgical history was reviewed and updated today  Family History  Mother    1  Family history of Emphysema   2  Family history of Hypertension (V17 49)  Father    3  Family history of Myocardial Ischemia  Family History    4  Family history of Coronary Artery Disease (V17 49)  Family History Reviewed: The family history was reviewed and updated today  Social History    · Caffeine Use   · Never A Smoker  Social History Reviewed: The social history was reviewed and is unchanged  Current Meds   1  AmLODIPine Besylate 5 MG Oral Tablet; TAKE 1 TABLET DAILY; Therapy: 28YGG4323 to (Evaluate:31Mar2018)  Requested for: 02Oct2017; Last   Rx:[id number] Ordered   2  Aspirin 81 MG Oral Tablet Delayed Release; Take 1 daily; Therapy: 56PUP2010 to (Last Rx:26Yko2877) Ordered   3  Ketorolac Tromethamine 10 MG Oral Tablet; TAKE 1 TABLET EVERY 8 HOURS AS   NEEDED FOR PAIN;   Therapy: 69HHE9928 to (Evaluate:24Ssu9743); Last Rx:[id number] Ordered   4  Omeprazole 20 MG Oral Capsule Delayed Release; take 1 capsule by mouth once daily   before EATING; Therapy: 23Xid3968 to 73 486 513)  Requested for: 62HHM0476; Last   Rx:[id number] Ordered  Medication List Reviewed:    The medication list was reviewed and updated today  Allergies    1  Lisinopril TABS   2  Metoprolol Tartrate TABS   3  Statins   4  Amoxicillin TABS   5  Penicillins   6  Sulfa Drugs    Vitals  Signs   Recorded: 77GQF5673 12:00PM   Temperature: 71 8 F  Systolic: 057, LUE, Sitting  Diastolic: 90, LUE, Sitting  BP Cuff Size: Large  Height: 5 ft 1 5 in  Weight: 193 lb 2 oz  BMI Calculated: 35 9  BSA Calculated: 1 87    Physical Exam    Constitutional   General appearance: No acute distress, well appearing and well nourished  Eyes   Conjunctiva and lids: No swelling, erythema or discharge  Pupils and irises: Equal, round and reactive to light  Ears, Nose, Mouth, and Throat   External inspection of ears and nose: Normal     Otoscopic examination: Tympanic membranes translucent with normal light reflex  Canals patent without erythema  Nasal mucosa, septum, and turbinates: Normal without edema or erythema  Oropharynx: Normal with no erythema, edema, exudate or lesions  Pulmonary   Respiratory effort: No increased work of breathing or signs of respiratory distress  Auscultation of lungs: Clear to auscultation  Cardiovascular   Auscultation of heart: Normal rate and rhythm, normal S1 and S2, without murmurs  Examination of extremities for edema and/or varicosities: Normal     Carotid pulses: Normal     Abdomen   Abdomen: Non-tender, no masses  Liver and spleen: No hepatomegaly or splenomegaly  Lymphatic   Palpation of lymph nodes in neck: No lymphadenopathy  Skin   Skin and subcutaneous tissue: Normal without rashes or lesions  Neurologic   Cranial nerves: Cranial nerves 2-12 intact      Psychiatric   Orientation to person, place, and time: Normal     Mood and affect: Normal          Future Appointments    Date/Time Provider Specialty Site   01/11/2018 12:30 PM Moiz Booth MD Neurology [address]   02/26/2018 11:00 AM Francisco Dunham DO Family Medicine [address] [place] [address] PRACTICE     Signatures   Electronically signed by : Colin Vanegas DO; Nov 20 2017 12:31PM EST                       (Author)

## 2018-01-11 NOTE — RESULT NOTES
Verified Results  (1) LIPID PANEL, FASTING [id number] 08:05AM Jonel Mohr     Test Name Result Flag Reference   CHOLESTEROL, TOTAL 237 mg/dL H 125-200   HDL CHOLESTEROL 38 mg/dL L > OR = 46   TRIGLICERIDES 302 mg/dL H <150   LDL-CHOLESTEROL 162 mg/dL (calc) H <130   Desirable range <100 mg/dL for patients with CHD or  diabetes and <70 mg/dL for diabetic patients with  known heart disease  CHOL/HDLC RATIO 6 2 (calc) H < OR = 5 0   NON HDL CHOLESTEROL 199 mg/dL (calc) H    Target for non-HDL cholesterol is 30 mg/dL higher than   LDL cholesterol target  (1) COMPREHENSIVE METABOLIC PANEL [id number] 99:12JL Jonel Mohr     Test Name Result Flag Reference   GLUCOSE 99 mg/dL  65-99   Fasting reference interval   UREA NITROGEN (BUN) 15 mg/dL  7-25   CREATININE 0 54 mg/dL L 0 60-0 93   For patients >52years of age, the reference limit  for Creatinine is approximately 13% higher for people  identified as -American  eGFR NON-AFR   AMERICAN 93 mL/min/1 73m2  > OR = 60   eGFR AFRICAN AMERICAN 108 mL/min/1 73m2  > OR = 60   BUN/CREATININE RATIO 28 (calc) H 6-22   SODIUM 143 mmol/L  135-146   POTASSIUM 2 9 mmol/L L 3 5-5 3   CHLORIDE 102 mmol/L     CARBON DIOXIDE 30 mmol/L  19-30   CALCIUM 8 9 mg/dL  8 6-10 4   PROTEIN, TOTAL 6 4 g/dL  6 1-8 1   ALBUMIN 3 9 g/dL  3 6-5 1   GLOBULIN 2 5 g/dL (calc)  1 9-3 7   ALBUMIN/GLOBULIN RATIO 1 6 (calc)  1 0-2 5   BILIRUBIN, TOTAL 0 6 mg/dL  0 2-1 2   ALKALINE PHOSPHATASE 72 U/L     AST 12 U/L  10-35   ALT 12 U/L  6-29     (1) CBC/PLT/DIFF [id number] 08:05AM Jonel Mohr   REPORT COMMENT:  FASTING:YES     Test Name Result Flag Reference   WHITE BLOOD CELL COUNT 10 2 Thousand/uL  3 8-10 8   RED BLOOD CELL COUNT 4 48 Million/uL  3 80-5 10   HEMOGLOBIN 13 2 g/dL  11 7-15 5   HEMATOCRIT 40 6 %  35 0-45 0   MCV 90 6 fL  80 0-100 0   MCH 29 3 pg  27 0-33 0   MCHC 32 4 g/dL  32 0-36 0   RDW 12 4 %  11 0-15 0   PLATELET COUNT 905 Thousand/uL  140-400 MPV 9 3 fL  7 5-11 5   ABSOLUTE NEUTROPHILS 6110 cells/uL  5309-0302   ABSOLUTE LYMPHOCYTES 3009 cells/uL  850-3900   ABSOLUTE MONOCYTES 847 cells/uL  200-950   ABSOLUTE EOSINOPHILS 204 cells/uL     ABSOLUTE BASOPHILS 31 cells/uL  0-200   NEUTROPHILS 59 9 %     LYMPHOCYTES 29 5 %     MONOCYTES 8 3 %     EOSINOPHILS 2 0 %     BASOPHILS 0 3 %

## 2018-01-12 VITALS
DIASTOLIC BLOOD PRESSURE: 98 MMHG | BODY MASS INDEX: 35.51 KG/M2 | WEIGHT: 193 LBS | HEIGHT: 62 IN | SYSTOLIC BLOOD PRESSURE: 180 MMHG

## 2018-01-12 NOTE — RESULT NOTES
Verified Results  (Q) FECAL GLOBIN BY IMMUNOCHEM  (MEDICARE) [id number] 12:00AM Gianluca Arango     Test Name Result Flag Reference   FECAL GLOBIN BY$IMMUNOCHEM  (MEDICARE)      FECAL GLOBIN BY IMMUNOCHEM   (MEDICARE)         MICRO NUMBER:      [id number]    TEST STATUS:       FINAL    SPECIMEN SOURCE:   INSURE (TM) FOBT TEST CARD    SPECIMEN QUALITY:  ADEQUATE    RESULT:            Not Detected

## 2018-01-12 NOTE — RESULT NOTES
Verified Results  (1) CBC/PLT/DIFF [id number] 09:51AM [employer]     Test Name Result Flag Reference   WHITE BLOOD CELL COUNT 9 6 Thousand/uL  3 8-10 8   RED BLOOD CELL COUNT 4 42 Million/uL  3 80-5 10   HEMOGLOBIN 13 4 g/dL  11 7-15 5   HEMATOCRIT 40 0 %  35 0-45 0   MCV 90 4 fL  80 0-100 0   MCH 30 4 pg  27 0-33 0   MCHC 33 7 g/dL  32 0-36 0   RDW 13 9 %  11 0-15 0   PLATELET COUNT 598 Thousand/uL  140-400   MPV 9 7 fL  7 5-11 5   ABSOLUTE NEUTROPHILS 5683 cells/uL  3943-1586   ABSOLUTE LYMPHOCYTES 2774 cells/uL  850-3900   ABSOLUTE MONOCYTES 806 cells/uL  200-950   ABSOLUTE EOSINOPHILS 288 cells/uL     ABSOLUTE BASOPHILS 48 cells/uL  0-200   NEUTROPHILS 59 2 %     LYMPHOCYTES 28 9 %     MONOCYTES 8 4 %     EOSINOPHILS 3 0 %     BASOPHILS 0 5 %       (1) COMPREHENSIVE METABOLIC PANEL [id number] 69:29AL [facility]     Test Name Result Flag Reference   GLUCOSE 111 mg/dL H 65-99   Fasting reference interval   UREA NITROGEN (BUN) 15 mg/dL  7-25   CREATININE 0 79 mg/dL  0 60-0 93   For patients >52years of age, the reference limit  for Creatinine is approximately 13% higher for people  identified as -American  eGFR NON-AFR   AMERICAN 73 mL/min/1 73m2  > OR = 60   eGFR AFRICAN AMERICAN 85 mL/min/1 73m2  > OR = 60   BUN/CREATININE RATIO   2-55   NOT APPLICABLE (calc)   SODIUM 141 mmol/L  135-146   POTASSIUM 3 4 mmol/L L 3 5-5 3   CHLORIDE 101 mmol/L     CARBON DIOXIDE 28 mmol/L  20-31   CALCIUM 9 2 mg/dL  8 6-10 4   PROTEIN, TOTAL 6 9 g/dL  6 1-8 1   ALBUMIN 4 2 g/dL  3 6-5 1   GLOBULIN 2 7 g/dL (calc)  1 9-3 7   ALBUMIN/GLOBULIN RATIO 1 6 (calc)  1 0-2 5   BILIRUBIN, TOTAL 0 6 mg/dL  0 2-1 2   ALKALINE PHOSPHATASE 77 U/L     AST 27 U/L  10-35   ALT 39 U/L H 6-29     (1) LIPID PANEL, FASTING [id number] 09:51AM [employer]     Test Name Result Flag Reference   CHOLESTEROL, TOTAL 219 mg/dL H 125-200   HDL CHOLESTEROL 56 mg/dL  > OR = 46   TRIGLICERIDES 919 mg/dL H <150 LDL-CHOLESTEROL 128 mg/dL (calc)  <130   Desirable range <100 mg/dL for patients with CHD or  diabetes and <70 mg/dL for diabetic patients with  known heart disease  CHOL/HDLC RATIO 3 9 (calc)  < OR = 5 0   NON HDL CHOLESTEROL 163 mg/dL (calc) H    Target for non-HDL cholesterol is 30 mg/dL higher than   LDL cholesterol target  (Q) TSH, 3RD GENERATION 18Oct2016 09:51AM Maria Luisa Doyle   REPORT COMMENT:  FASTING:YES     Test Name Result Flag Reference   TSH 5 01 mIU/L H 0 40-4 50       Plan  Subclinical hypothyroidism    · (1) TSH WITH FT4 REFLEX; Status:Active;  Requested KDS:[id number];

## 2018-01-12 NOTE — PROGRESS NOTES
Assessment   1  Acute thalamic infarction (434 91) (I63 9)    Plan   Acute thalamic infarction    · *1 - [facility] (CARDIOLOGY ) Co-Management  *at next available visit     Status: Active  Requested for: 63SOK7397   Ordered; For: Acute thalamic infarction; Ordered By: Christiano Freedman Performed:  Due: 55HTS7718  Care Summary provided  : Yes  Acute thalamic infarction, Hyperlipidemia, Hypertension    · Follow-up visit in 3 months Evaluation and Treatment  Follow-up  Status: Hold For -    Scheduling  Requested for: 68ZKM6944   Ordered; For: Acute thalamic infarction, Hyperlipidemia, Hypertension; Ordered By: Christiano Freedman Performed:  Due: 21OBF0337    Discussion/Summary   Discussion Summary:    80-year-old female who is here as a hospital discharge follow-up for left thalamic stroke  Etiology of her stroke is large vessel because she had atherosclerotic disease of the left P2 segment  She does have atherosclerotic disease in her other vessels as well  MRI brain showed left thalamic stroke personally reviewed  MRA head and neck showed diffuse atherosclerotic disease  Echo showed EF of 70-75% with hypertrophic changes  She has uncontrolled, labile hypertension at baseline currently on amlodipine 10 mg b i d  which does not seem to be helping  Her blood pressure in the office today was 207/88 upon rechecking it again it was 156/70   with aspirin and Plavix until 02/13/2018, recommend stopping aspirin after and continuing Plavix indefinitely echo showed EF of 70-75% with hypertrophic cardiomyopathy I recommended she see a cardiologist at the next available visit  far as her blood pressure is concerned, she will need tighter blood pressure control and more workup to figure out why she has variability in her blood pressure almost every day  will plan for her to return to the office in 3 months, I would be happy to see them sooner if the need arises    was advised to the call the office if they have any questions and concerns in the meantime  Patient/Guardian does understand that if they have any new stroke like symptoms such as facial droop on one side, weakness/paralysis on either side, speech trouble, numbness on one side, balance issues, any vision changes, or any new headache, to call 9-1-1 immediately or to proceed to the nearest ER immediately  Chief Complaint   Chief Complaint Free Text Note Form: Patient present for hospital follow-up appointment regarding stroke      History of Present Illness   HPI: Rj Moreland is a 69year old  female who is here as a hospital follow up for left thalamic stroke  MRA showed Scattered atherosclerotic disease involving the intracranial vasculature including mild to moderate focal narrowing of the left P2 segment of the posterior cerebral artery is patient with acute thalamic infarction, and mild narrowing of the intracranial segment of internal carotid artery, right A1 segment, and distal M1 and left distal vertebral artery stenosis  Echo showed EF of 70-75%  Etiology of the stroke is large vessel due to the intracranial stenosis of the left P2 segment which is likely the cause of the left thalamic stroke  BP was 207/80 and upon rechecking it was 150s  She is doing wel and does not have any deficits but does struggle with BP control, and has been having fluctuations between 120-150s  She is compliant with medications  SHe is on Amlodipine 10mg pO qdaily and increased to 10mg PO BID on 12/3  She went to the ER and at that time BP was 140/80  new stroke like symptoms  She was on Aspirin and Plavix currently now  No new stroke like symptoms  She could not tolerate Pravastatin 10mg pO qdaily due to muscle cramps and she has tried Coenzyme with this and she broke out in hives  Review of Systems   ROS Reviewed:    ROS reviewed  Active Problems   1  Acute thalamic infarction (434 91) (I63 9)   2  Allergic rhinitis (477 9) (J30 9)   3   Fecal occult blood test positive (792 1) (R19 5)   4  GERD without esophagitis (530 81) (K21 9)   5  Hyperlipidemia (272 4) (E78 5)   6  Hypertension (401 9) (I10)   7  Hypokalemia (276 8) (E87 6)   8  Vision disturbance (368 9) (H53 9)    Past Medical History   1  History of Abdominal bloating (787 3) (R14 0)   2  History of Adverse Effect Of Drug Therapy (995 20)   3  History of Arthralgia (719 40) (M25 50)   4  Cough (786 2) (R05)   5  Cough (786 2) (R05)   6  History of Dysuria (788 1) (R30 0)   7  History of acute sinusitis (V12 69) (Z87 09)   8  History of acute sinusitis (V12 69) (Z87 09)   9  History of left flank pain (V13 89) (Z87 898)   10  History of urinary frequency (V13 09) (Z87 898)   11  History of urinary tract infection (V13 02) (Z87 440)   12  History of Medicare annual wellness visit, initial (V70 0) (Z00 00)   13  History of Muscle pain (729 1) (M79 1)   14  History of Subclinical hypothyroidism (244 8) (E03 9)   15  History of Urgency of urination (788 63) (R39 15)  Active Problems And Past Medical History Reviewed: The active problems and past medical history were reviewed and updated today  Surgical History   1  History of Appendectomy   2  History of Appendectomy  Surgical History Reviewed: The surgical history was reviewed and updated today  Family History   Mother    1  Family history of Emphysema   2  Family history of Hypertension (V17 49)  Father    3  Family history of Myocardial Ischemia  Family History    4  Family history of Coronary Artery Disease (V17 49)  Family History Reviewed: The family history was reviewed and updated today  Social History    · Caffeine Use   · Never A Smoker  Social History Reviewed: The social history was reviewed and updated today  The social history was reviewed and is unchanged  Current Meds    1  AmLODIPine Besylate 10 MG Oral Tablet; TAKE 1 TABLET TWICE DAILY;      Therapy: 77TXK6563 to (Evaluate:03Gth3116)  Requested for: 94AXV3453; Last Rx:93Htr6657 Ordered   2  Clopidogrel Bisulfate 75 MG Oral Tablet; take 1 tablet by mouth every day; Therapy: 89FMJ2092 to (Evaluate:14Jan2018)  Requested for: 27ODW9525; Last     Rx:69Bsu2295 Ordered   3  Doxazosin Mesylate 1 MG Oral Tablet; TAKE 1 TABLET DAILY; Therapy: 75BZP2723 to (Evaluate:13Jun2018)  Requested for: 83LQE3533; Last     Rx:68Zca0027 Ordered   4  EQ Aspirin 325 MG Oral Tablet Delayed Release; Take 1 tablet daily; Therapy: 33FYZ7893 to (Last Rx:20Nov2017) Ordered   5  Omeprazole 20 MG Oral Capsule Delayed Release; take 1 capsule by mouth once daily     before EATING; Therapy: 15Apr2016 to (Evaluate:10Apr2018)  Requested for: 50UWZ2344; Last     Rx:10Jan2018 Ordered   6  Pantoprazole Sodium 40 MG Oral Tablet Delayed Release; Take 1 tablet daily; Therapy: (Recorded:11Jan2018) to Recorded   7  Pravastatin Sodium 10 MG Oral Tablet; One tablet daily; Therapy: 36JLG0440 to (Evaluate:14Apr2018)  Requested for: 18OVQ0497; Last     Rx:89Mrb5031 Ordered  Medication List Reviewed: The medication list was reviewed and updated today  Allergies   1  Lisinopril TABS   2  Metoprolol Tartrate TABS   3  Statins   4  Amoxicillin TABS   5  Penicillins   6   Sulfa Drugs    Vitals   Signs   Recorded: 34DWK8587 01:11PM   Heart Rate: 95  Respiration: 16  Systolic: 123  Diastolic: 70  Height: 5 ft 1 5 in  Weight: 192 lb 6 oz  BMI Calculated: 35 76  BSA Calculated: 1 87  O2 Saturation: 97  Heart Rate: 964  Systolic: 102  Diastolic: 88  O2 Saturation: 97    Physical Exam   General:  Alert awake oriented     HEENT normocephalic atraumatic     Skin no lesions     Neuro exam     Mental status alert awake oriented     Speech fluent no dysarthria no aphasia noted     Cranial nerves:  Pupils equal reactive to light and accommodation extraocular movements intact no facial asymmetry noted shoulder shrug strong     Motor 5/5 throughout     Gait normal         Results/Data   Diagnostic Studies Reviewed: MRI Review MRI Brain- 11/13/17 1 cm acute infarction involving the left lateral thalamus without mass effect or hemorrhagic transformation  cerebral volume loss and scattered white matter change consistent with chronic microangiopathy  11/13/2017 atherosclerotic disease involving the intracranial vasculature including mild to moderate focal narrowing of the left P2 segment of the posterior cerebral artery is patient with acute thalamic infarction  areas of mild to moderate atherosclerotic narrowing narrowing include the right intracranial internal carotid artery, right A1 segment, left distal M1 segment and left distal vertebral artery  Diagnostic Review Lipid Panel 11/14/2017 230 134 41 162 A1C 6 1     Carotid- 11/14/17 is <50% stenosis noted in the internal carotid artery  Plaque is artery flow is antegrade  There is no significant subclavian artery disease  LEFT: is <50% stenosis noted in the internal carotid artery  Plaque is artery flow is antegrade  There is no significant subclavian artery disease  Future Appointments      Date/Time Provider Specialty Site   02/12/2018 11:00 AM VISHAL Hutton   Nephrology [place] [address]   02/26/2018 11:00 AM Cinthya Cummings DO Family Medicine [place] [facility]     Signatures    Electronically signed by : Denton Henderson MD; Jan 11 2018  1:13PM EST                       (Author)

## 2018-01-13 VITALS
BODY MASS INDEX: 35.54 KG/M2 | SYSTOLIC BLOOD PRESSURE: 156 MMHG | WEIGHT: 193.13 LBS | TEMPERATURE: 98.4 F | HEIGHT: 62 IN | DIASTOLIC BLOOD PRESSURE: 90 MMHG

## 2018-01-13 VITALS
SYSTOLIC BLOOD PRESSURE: 142 MMHG | DIASTOLIC BLOOD PRESSURE: 82 MMHG | BODY MASS INDEX: 35.7 KG/M2 | WEIGHT: 194 LBS | HEIGHT: 62 IN

## 2018-01-13 NOTE — RESULT NOTES
Verified Results  (1) CBC/PLT/DIFF 79GQS5130 07:39AM Ashly Mac     Test Name Result Flag Reference   WHITE BLOOD CELL COUNT 8 6 Thousand/uL  3 8-10 8   RED BLOOD CELL COUNT 4 39 Million/uL  3 80-5 10   HEMOGLOBIN 13 2 g/dL  11 7-15 5   HEMATOCRIT 38 0 %  35 0-45 0   MCV 86 7 fL  80 0-100 0   MCH 30 1 pg  27 0-33 0   MCHC 34 7 g/dL  32 0-36 0   RDW 14 3 %  11 0-15 0   PLATELET COUNT 190 Thousand/uL  140-400   ABSOLUTE NEUTROPHILS 4799 cells/uL  7420-3617   ABSOLUTE LYMPHOCYTES 2666 cells/uL  850-3900   ABSOLUTE MONOCYTES 791 cells/uL  200-950   ABSOLUTE EOSINOPHILS 335 cells/uL     ABSOLUTE BASOPHILS 9 cells/uL  0-200   NEUTROPHILS 55 8 %     LYMPHOCYTES 31 0 %     MONOCYTES 9 2 %     EOSINOPHILS 3 9 %     BASOPHILS 0 1 %     MPV 10 0 fL  7 5-12 5     (1) LIPID PANEL, FASTING [id number] 07:39AM Ashly [facility]     Test Name Result Flag Reference   CHOLESTEROL, TOTAL 265 mg/dL H 125-200   HDL CHOLESTEROL 53 mg/dL  > OR = 46   TRIGLICERIDES 903 mg/dL H <150   LDL-CHOLESTEROL 174 mg/dL (calc) H <130   Desirable range <100 mg/dL for patients with CHD or  diabetes and <70 mg/dL for diabetic patients with  known heart disease  CHOL/HDLC RATIO 5 0 (calc)  < OR = 5 0   NON HDL CHOLESTEROL 212 mg/dL (calc) H    Target for non-HDL cholesterol is 30 mg/dL higher than   LDL cholesterol target       (1) THYROXINE [id number] 07:39AM Ashly [facility]     Test Name Result Flag Reference   T4 (THYROXINE), TOTAL 9 4 mcg/dL  4 5-12 0     (1) COMPREHENSIVE METABOLIC PANEL 01IQO3006 73:41LU Ashly [facility]     Test Name Result Flag Reference   GLUCOSE 124 mg/dL H 65-99   Fasting reference interval     For someone without known diabetes, a glucose value  between 100 and 125 mg/dL is consistent with  prediabetes and should be confirmed with a  follow-up test    UREA NITROGEN (BUN) 14 mg/dL  7-25   CREATININE 0 77 mg/dL  0 60-0 93   For patients >52years of age, the reference limit  for Creatinine is approximately 13% higher for people  identified as -American  eGFR NON-AFR  AMERICAN 76 mL/min/1 73m2  > OR = 60   eGFR AFRICAN AMERICAN 88 mL/min/1 73m2  > OR = 60   BUN/CREATININE RATIO   4-67   NOT APPLICABLE (calc)   SODIUM 145 mmol/L  135-146   POTASSIUM 3 0 mmol/L L 3 5-5 3   CHLORIDE 101 mmol/L     CARBON DIOXIDE 28 mmol/L  20-31   CALCIUM 9 1 mg/dL  8 6-10 4   PROTEIN, TOTAL 6 7 g/dL  6 1-8 1   ALBUMIN 4 0 g/dL  3 6-5 1   GLOBULIN 2 7 g/dL (calc)  1 9-3 7   ALBUMIN/GLOBULIN RATIO 1 5 (calc)  1 0-2 5   BILIRUBIN, TOTAL 0 6 mg/dL  0 2-1 2   ALKALINE PHOSPHATASE 75 U/L     AST 24 U/L  10-35   ALT 36 U/L H 6-29     (Q) SED RATE BY MODIFIED WESTERGREN [id number] 07:39AM Nita Boeck     Test Name Result Flag Reference   SED RATE BY MODIFIED$WESTERGREN 9 mm/h  < OR = 30     (Q) LYME DISEASE AB, TOTAL W/REFL WB (IGG, IGM) [id number] 07:39AM Nita Boeck     Test Name Result Flag Reference   LYME AB SCREEN <0 90 index     Index                Interpretation                     -----                --------------                     < 0 90               Negative                     0  90-1 09            Equivocal                     > 1 09               Positive      As recommended by the Food and Drug Administration   (FDA), all samples with positive or equivocal   results in a Borrelia burgdorferi antibody screen  will be tested using a blot method  Positive or   equivocal screening test results should not be   interpreted as truly positive until verified as such   using a supplemental assay (e g , B  burgdorferi blot)  The screening test and/or blot for B  burgdorferi   antibodies may be falsely negative in early stages  of Lyme disease, including the period when erythema   migrans is apparent       (Q) BERTHA SCREEN, IFA, WITH REFLEX TO TITER AND PATTERN [id number] 07:39AM Nita Boeck     Test Name Result Flag Reference   BERTHA SCREEN, IFA NEGATIVE  NEGATIVE   BERTHA IFA is a first line screen for detecting the  presence of up to approximately 150 autoantibodies in  various autoimmune diseases  A negative BERTHA IFA result  suggests BERTHA-associated autoimmune diseases are not  present at this time  Visit Physician FAQs for interpretation of all  antibodies in the [place], prevalence, and association  with diseases at [email or web address]  [email or web address]     (1) RF QUANTITATION [id number] 07:39AM [facility]     Test Name Result Flag Reference   RHEUMATOID FACTOR 9 IU/mL  <14     (1) C-REACTIVE PROTEIN [id number] 07:39AM [facility]     Test Name Result Flag Reference   C-REACTIVE PROTEIN 0 86 mg/dL H <0 80   Please be advised that patients taking Carboxypenicillins  may exhibit falsely decreased C-Reactive Protein levels  due to an analytical interference in this assay       (Q) TSH, 3RD GENERATION [id number] 07:39AM [facility]   REPORT COMMENT:  FASTING:YES     Test Name Result Flag Reference   TSH 4 49 mIU/L  0 40-4 50

## 2018-01-14 VITALS
BODY MASS INDEX: 35.36 KG/M2 | DIASTOLIC BLOOD PRESSURE: 84 MMHG | WEIGHT: 192.13 LBS | HEIGHT: 62 IN | SYSTOLIC BLOOD PRESSURE: 132 MMHG

## 2018-01-15 NOTE — RESULT NOTES
Verified Results  (Q) FECAL GLOBIN BY IMMUNOCHEM  (MEDICARE) [id number] 12:00AM Ulises Vareland   REPORT COMMENT:  2ND-3/14/16     Test Name Result Flag Reference   FECAL GLOBIN BY$IMMUNOCHEM  (MEDICARE)  A    FECAL GLOBIN BY IMMUNOCHEM   (MEDICARE)         MICRO NUMBER:      [id number]    TEST STATUS:       FINAL    SPECIMEN SOURCE:   INSURE (TM) FOBT TEST CARD    SPECIMEN QUALITY:  ADEQUATE    RESULT:            Detected       Plan  Encounter for screening colonoscopy, Fecal occult blood test positive    · 1 Oscar Gautam MD, Pati Dowling  (General Surgery) Physician Referral  Consult  Status: Active   Requested for: 66MWU4193  Care Summary provided  : Yes

## 2018-01-16 NOTE — RESULT NOTES
Verified Results  (1) URINE CULTURE 95Qof0718 12:00AM Gianluca Arango     Test Name Result Flag Reference   CULTURE, URINE, ROUTINE  A    CULTURE, URINE, ROUTINE         MICRO NUMBER:      [id number]    TEST STATUS:       FINAL    SPECIMEN SOURCE:   URINE    SPECIMEN QUALITY:  ADEQUATE    RESULT:            Greater than 100,000 CFU/mL of Escherichia coli                            E coli                            ----------------                            INT   JYOTHI     AMOX/CLAVULANATE       S     <=2 0     AMPICILLIN             S     <=2 0     AMP/SULBACTAM          S     <=2 0     CEFAZOLIN              NR    <=4 0 **1     CEFEPIME               S     <=1 0     CEFTRIAXONE            S     <=1 0     CIPROFLOXACIN          S     <=0 25     ERTAPENEM              S     <=0 5     GENTAMICIN             S     <=1 0     IMIPENEM               S     <=0 25     LEVOFLOXACIN           S     <=0 12     NITROFURANTOIN         S     <=16 0     PIP/TAZOBACTAM         S     <=4 0     TOBRAMYCIN             S     <=1 0     TRIMETHOPRIM/SULFA     S     <=20 0  S=Susceptible  I=Intermediate  R=Resistant  * = Not Tested  NR = Not Reported  **NN = See Therapy Comments  THERAPY COMMENTS      Note 1:      ORAL therapy: A cefazolin JYOTHI of < 32 predicts      susceptibility to the oral agents cefaclor,      cefdinir, cefpodoxime, cefprozil, cefuroxime,      cephalexin, and loracarbef when used for therapy      of uncomplicated UTIs due to E  coli,      K  pneumoniae, and P  mirabilis  PARENTERAL therapy: A cefazolin JYOTHI of > 8      indicates resistance to parenteral cefazolin  An alternate test method must be performed to      to confirm susceptibility to parenteral cefazolin

## 2018-01-16 NOTE — PROGRESS NOTES
Chief Complaint  here for BP check-140/034-[id number]      Active Problems    1  Allergic rhinitis (477 9) (J30 9)   2  Encounter for screening colonoscopy (V76 51) (Z12 11)   3  Encounter for screening mammogram for breast cancer (V76 12) (Z12 31)   4  Fecal occult blood test positive (792 1) (R19 5)   5  GERD without esophagitis (530 81) (K21 9)   6  Hyperlipidemia (272 4) (E78 5)   7  Hypertension (401 9) (I10)    Current Meds   1  Aspirin 81 MG Oral Tablet Delayed Release; Take 1 daily; Therapy: 86CVS4245 to (Last Rx:98Okz6723) Ordered   2  PriLOSEC OTC 20 MG Oral Tablet Delayed Release; TAKE 1 TABLET DAILY; Therapy: 06TQN8687 to (Evaluate:03Wzd6794); Last Rx:09Mar2016 Ordered   3  Simvastatin 20 MG Oral Tablet; TAKE 1 TABLET DAILY; Therapy: 37QHH0157 to (Evaluate:33Ovy0952); Last Rx:09Mar2016 Ordered    Allergies    1  Amoxicillin TABS   2  Lisinopril TABS   3  Penicillins   4  Sulfa Drugs    Vitals  Signs [Data Includes: Current Encounter]    Systolic: 607, LUE, Sitting  Diastolic: 672, LUE, Sitting  BP Cuff Size: Large    Assessment    1  Hypertension (401 9) (I10)    Plan  Hypertension    · AmLODIPine Besylate 2 5 MG Oral Tablet; TAKE 1 TABLET DAILY   · Follow-up visit in 2 weeks Evaluation and Treatment  Follow-up  Status: Hold For -  Scheduling  Requested for: 01Apr2016    Future Appointments    Date/Time Provider Specialty Site   04/18/2016 01:00 PM Ayanna Waller MD General Surgery [facility]   09/15/2016 11:00 AM Erin Tejeda DO Family Medicine [facility]     Signatures   Electronically signed by : Angelia Powers DO;  Apr 1 2016 10:33AM EST                       (Author)

## 2018-01-18 NOTE — RESULT NOTES
Verified Results  (1) POTASSIUM [id number] 12:00AM nIga Serna     Test Name Result Flag Reference   POTASSIUM 4 5 mmol/L  3 5-5 3

## 2018-01-22 VITALS
WEIGHT: 192.38 LBS | SYSTOLIC BLOOD PRESSURE: 156 MMHG | RESPIRATION RATE: 16 BRPM | BODY MASS INDEX: 35.4 KG/M2 | HEART RATE: 95 BPM | DIASTOLIC BLOOD PRESSURE: 70 MMHG | OXYGEN SATURATION: 97 % | HEIGHT: 62 IN

## 2018-01-24 VITALS
BODY MASS INDEX: 35.43 KG/M2 | HEIGHT: 62 IN | WEIGHT: 192.5 LBS | SYSTOLIC BLOOD PRESSURE: 152 MMHG | DIASTOLIC BLOOD PRESSURE: 78 MMHG

## 2018-01-26 RX ORDER — PANTOPRAZOLE SODIUM 40 MG/1
1 TABLET, DELAYED RELEASE ORAL DAILY
COMMUNITY
End: 2018-03-27 | Stop reason: ALTCHOICE

## 2018-01-26 RX ORDER — DOXAZOSIN MESYLATE 1 MG/1
1 TABLET ORAL DAILY
COMMUNITY
Start: 2017-12-15 | End: 2018-01-30 | Stop reason: CLARIF

## 2018-01-26 RX ORDER — AMLODIPINE BESYLATE 10 MG/1
10 TABLET ORAL 2 TIMES DAILY
Refills: 1 | COMMUNITY
Start: 2018-01-11 | End: 2018-01-29 | Stop reason: SDUPTHER

## 2018-01-29 ENCOUNTER — OFFICE VISIT (OUTPATIENT)
Dept: INTERNAL MEDICINE CLINIC | Facility: CLINIC | Age: 78
End: 2018-01-29
Payer: COMMERCIAL

## 2018-01-29 VITALS
TEMPERATURE: 97.1 F | WEIGHT: 194.2 LBS | RESPIRATION RATE: 16 BRPM | HEART RATE: 108 BPM | DIASTOLIC BLOOD PRESSURE: 78 MMHG | BODY MASS INDEX: 35.74 KG/M2 | HEIGHT: 62 IN | SYSTOLIC BLOOD PRESSURE: 180 MMHG

## 2018-01-29 DIAGNOSIS — I11.9 HYPERTENSIVE LEFT VENTRICULAR HYPERTROPHY, WITHOUT HEART FAILURE: ICD-10-CM

## 2018-01-29 DIAGNOSIS — E87.6 HYPOKALEMIA: ICD-10-CM

## 2018-01-29 DIAGNOSIS — I10 ESSENTIAL HYPERTENSION: ICD-10-CM

## 2018-01-29 DIAGNOSIS — E78.5 HYPERLIPIDEMIA, UNSPECIFIED HYPERLIPIDEMIA TYPE: ICD-10-CM

## 2018-01-29 DIAGNOSIS — I63.9 ACUTE CVA (CEREBROVASCULAR ACCIDENT) (HCC): Primary | ICD-10-CM

## 2018-01-29 PROCEDURE — 99214 OFFICE O/P EST MOD 30 MIN: CPT | Performed by: INTERNAL MEDICINE

## 2018-01-29 PROCEDURE — 93000 ELECTROCARDIOGRAM COMPLETE: CPT | Performed by: INTERNAL MEDICINE

## 2018-01-29 RX ORDER — LOSARTAN POTASSIUM 50 MG/1
50 TABLET ORAL DAILY
Qty: 30 TABLET | Refills: 0 | Status: SHIPPED | OUTPATIENT
Start: 2018-01-29 | End: 2018-02-13 | Stop reason: SDUPTHER

## 2018-01-29 RX ORDER — AMLODIPINE BESYLATE 10 MG/1
10 TABLET ORAL DAILY
Qty: 30 TABLET | Refills: 0
Start: 2018-01-29 | End: 2018-02-13 | Stop reason: SDUPTHER

## 2018-01-30 ENCOUNTER — OFFICE VISIT (OUTPATIENT)
Dept: CARDIOLOGY CLINIC | Facility: CLINIC | Age: 78
End: 2018-01-30
Payer: COMMERCIAL

## 2018-01-30 VITALS
RESPIRATION RATE: 16 BRPM | BODY MASS INDEX: 36.44 KG/M2 | WEIGHT: 193 LBS | HEIGHT: 61 IN | HEART RATE: 88 BPM | DIASTOLIC BLOOD PRESSURE: 70 MMHG | SYSTOLIC BLOOD PRESSURE: 162 MMHG

## 2018-01-30 DIAGNOSIS — I10 HYPERTENSION, UNSPECIFIED TYPE: Primary | ICD-10-CM

## 2018-01-30 DIAGNOSIS — E78.5 HYPERLIPIDEMIA, UNSPECIFIED HYPERLIPIDEMIA TYPE: ICD-10-CM

## 2018-01-30 DIAGNOSIS — I51.7 LVH (LEFT VENTRICULAR HYPERTROPHY): ICD-10-CM

## 2018-01-30 PROCEDURE — 99204 OFFICE O/P NEW MOD 45 MIN: CPT | Performed by: INTERNAL MEDICINE

## 2018-01-30 NOTE — PROGRESS NOTES
Assessment/Plan:    No problem-specific Assessment & Plan notes found for this encounter  Diagnoses and all orders for this visit:    Acute CVA (cerebrovascular accident) ([facility] [address] )  -     CBC and differential; Future  -     Comprehensive metabolic panel; Future  -     TSH, 3rd generation with T4 reflex; Future  -     Hemoglobin A1c; Future  -     Lipid Panel with Direct LDL reflex; Future  -     CBC and differential  -     Comprehensive metabolic panel  -     TSH, 3rd generation with T4 reflex  -     Hemoglobin A1c  -     Lipid Panel with Direct LDL reflex    Essential hypertension  -     amLODIPine (NORVASC) 10 mg tablet; Take 1 tablet (10 mg total) by mouth daily    Hypertensive left ventricular hypertrophy, without heart failure  -     POCT ECG  -     CBC and differential; Future  -     Comprehensive metabolic panel; Future  -     TSH, 3rd generation with T4 reflex; Future  -     losartan (COZAAR) 50 mg tablet; Take 1 tablet (50 mg total) by mouth daily  -     24 hour blood pressure monitoring; Future  -     CBC and differential  -     Comprehensive metabolic panel  -     TSH, 3rd generation with T4 reflex    Hyperlipidemia, unspecified hyperlipidemia type    Hypokalemia  -     Comprehensive metabolic panel; Future  -     Comprehensive metabolic panel      I evaluated prior medications and looked in detail about her previous physician notes  It seems like in 2012 she was on lisinopril but this was discontinued due to a cough  It during 1 of the ER visit she was started on metoprolol but this was replaced with an increased dose of amlodipine  At this time I think a good regimen for her would be a combination of an Arb along with a beta-blocker  I will reduce her amlodipine dose to 10 mg and add on losartan  Ultimately probably the plan would be to discontinue the amlodipine and replace it with the beta-blocker    Her echocardiogram during the hospital stay showed concentric left ventricular hypertrophy with no dynamic obstruction but had evidence of outflow obstruction at rest   Will check her electrolytes after starting the losartan and potentially this should help her with the hypokalemia  I will arrange for a 24 hour blood pressure monitoring  Await cardiology visit  An EKG was done in the office today which showed sinus rhythm with no acute ST T wave changes as compared to prior EKG during hospital stay  Recheck lipid panel  Subjective:      Patient ID: Liz Spivey is a 69 y o  female  She comes in to establish care as a new patient  She has hypertension, history of acute CVA in November, hyperlipidemia, hypokalemia  She notes that her blood pressure has been very difficult to control for some time now  She was admitted to the hospital for an stroke in November of 2017  Luckily she did not have any major deficits from the event and had a near normal recovery  She was in the past treated with 5 mg of amlodipine which was increased to currently 20 mg of amlodipine  She has had fluctuating blood pressures with systolics that range between 316 to 271 systolic and diastolics between   Most recent numbers have been in around the 231 systolic and 206 diastolic  She notes that increase in the amlodipine dose has caused her to have pedal edema, hoarse voice and feeling of fatigue and on well  She will be seeing the cardiologist soon  She has also had hypokalemia in the last few blood work  She will be seeing a nephrologist in the next 1 month  She has not been able to tolerate any statin  She was tried on simvastatin, pravastatin, atorvastatin with muscle aches  Co Q10 gave her a rash and she has in the past also not tolerated ezetimibe  Shortness of Breath   Associated symptoms include headaches  Pertinent negatives include no abdominal pain, chest pain, ear pain, fever, rash or sore throat         The following portions of the patient's history were reviewed and updated as appropriate:   Review of Systems   Constitutional: Positive for fatigue  Negative for fever and unexpected weight change  HENT: Negative for ear pain, hearing loss and sore throat  Eyes: Negative for pain and discharge  Respiratory: Positive for shortness of breath  Negative for cough and chest tightness  Cardiovascular: Negative for chest pain and palpitations  Gastrointestinal: Negative for abdominal pain, blood in stool, constipation, diarrhea and nausea  Genitourinary: Negative for dysuria, frequency and hematuria  Musculoskeletal: Negative for arthralgias and joint swelling  Skin: Negative for rash  Allergic/Immunologic: Negative for immunocompromised state  Neurological: Positive for headaches  Negative for dizziness  Hematological: Negative for adenopathy  Psychiatric/Behavioral: Negative for confusion and sleep disturbance  Objective:     Physical Exam   Constitutional: She appears well-developed and well-nourished  HENT:   Head: Normocephalic and atraumatic  Nose: Nose normal    Mouth/Throat: Oropharynx is clear and moist    Eyes: Conjunctivae are normal  Pupils are equal, round, and reactive to light  Right eye exhibits no discharge  Neck: Normal range of motion  Neck supple  No thyromegaly present  Cardiovascular: Regular rhythm, S1 normal, S2 normal and normal pulses  Tachycardia present  PMI is not displaced  No murmur heard  Pulmonary/Chest: Breath sounds normal  No accessory muscle usage  No apnea  No respiratory distress  She has no rhonchi  She has no rales  Abdominal: Soft  Normal appearance and bowel sounds are normal  She exhibits no shifting dullness  There is no hepatosplenomegaly  There is no tenderness  There is no rebound and no CVA tenderness  Musculoskeletal: Normal range of motion  She exhibits no edema or tenderness  Lymphadenopathy:     She has no cervical adenopathy  Neurological: She is alert  Skin: Skin is warm and intact  No rash noted  Psychiatric: She has a normal mood and affect  Her speech is normal    Nursing note and vitals reviewed

## 2018-01-30 NOTE — PROGRESS NOTES
Cardiology Consultation     El Finch  [id number]  1940  [address]  [address]      1  Hypertension, unspecified type  Aldosterone    Aldosterone   2  Hyperlipidemia, unspecified hyperlipidemia type     3  LVH (left ventricular hypertrophy)         Discussion/Summary:    Pleasant 56-year-old female who has concentric hypertrophy secondary to longstanding hypertension  No evidence of hypertrophic cardiomyopathy  Despite severe LVH, only grade 1 diastolic dysfunction noted on the echo, and she does not have clinically any significant volume overload on exam     We had long discussion today regarding her blood pressure control  She has seen a new primary care physician yesterday with a good plan outlined for control of her blood pressure  I agree with the initiation of the losartan  I think the combination of losartan and amlodipine start would be fine for blood pressure control  Her PCP indicated that switching to a beta blocker may be preferred  I am not too concerned with the hyperdynamic function, so strictly speaking I don't think she absolutely needs a beta-blocker  Whatever works best to control her blood pressure is fine  If a beta blocker is to be used for hypertension, I would recommend trying carvedilol or labetalol which both have some alpha blocking component to them in addition to beta-blocker and tend to be better blood pressure medications and metoprolol  She is not taking doxazosin, was never filled  She follows a low sodium diet already, and this was reviewed with her  The 1 additional testing I will recommend will be all bowel strong testing  She has hypokalemia with unclear etiology  She has also been hypernatremic in the past   With accelerated hypertension, hyper aldosterone state is certainly possible    If this is the case, then targeted therapy with Aldactone would be reasonable, and if there is an exogenous source, then identification of this would also be important  In this regard, she also has follow-up with a nephrologist planned  Overall, I cautioned her against having too many people make changes to her medications at the same time  As such, I'm not going to make any changes today  She's already purchased a home near [place], [place]  She will be moving in April  Given this, and plans for follow up with nephrology and her PCP with ambulatory BP monitoring already scheduled, I have not made a follow up appointment with me  If she has any changes, new symptoms, or concerns, she can certainly make a new appointment at any time  History of Present Illness:  81-year-old female  She has longstanding hypertension which has been somewhat difficult to control  In November of this year, she suffered a CVA  Has recovered well since then  Since that time, she continues to struggle a bit with blood pressure  She saw her neurologist in follow-up  She was recommended to have evaluation with us given findings on echocardiogram done in evaluation of a CVA  Three EF was hyperdynamic  Severe concentric hypertrophy  Mid cavitary obstruction which was mild at 23 mm of mercury which was secondary to hyperdynamic function and LVH  No evidence of hypertrophic cardiomyopathy  There have been some changes made to her medications, but despite this, blood pressure continues to be elevated  She saw a new primary care provider just yesterday, and I reviewed the notes with the patient  She has a good plan outlined with regards to treatment of her blood pressure  The patient takes blood pressure at home with a home cuff which has been correlated with her visiting nurse previously  Blood pressure is quite labile  She also has hypokalemia noted on blood work previously  Sodium similarly was high when checked in May of 2017      She was prescribed losartan yesterday, but has not yet picked this up  Blood work was also ordered, but she has not gotten this done just in case we want to add anything to that  She denies any chest pain or palpitations  She does feel some shortness of breath  Otherwise, she is not significantly active, but denies any major limitations  Patient Active Problem List   Diagnosis    HTN (hypertension)    HLD (hyperlipidemia)    Hypokalemia    Right sided sciatica    Acute CVA [facility]cerebrovascular accident) ([facility] [address] )    LVH (left ventricular hypertrophy)     Past Medical History:   Diagnosis Date    Abdominal bloating     Resolved - 9/15/16    Adverse effect of drug therapy     Last assessed - 1/4/13    Allergic rhinitis     Arthralgia     Last assessed - 5/15/17    Dysuria     Last assessed - 2/22/16    GERD (gastroesophageal reflux disease)     Headache     Hyperlipidemia     Hypertension     Left flank pain     Last assessed - 2/22/16    Muscle pain     Last assessed - 5/15/17    Subclinical hypothyroidism     Last assessed - 5/1/17    Urgency of urination     Last assessed - 2/22/16    Urinary frequency     Last assessed - 11/16/12     Social History     Social History    Marital status: Single     Spouse name: N/A    Number of children: N/A    Years of education: N/A     Occupational History    Not on file       Social History Main Topics    Smoking status: Former Smoker    Smokeless tobacco: Never Used      Comment: Never a smoker per [employer]     Alcohol use No    Drug use: No    Sexual activity: Not on file     Other Topics Concern    Not on file     Social History Narrative    Caffeine use          Family History   Problem Relation Age of Onset    Emphysema Mother     Hypertension Mother     Other Father      Myocardial Ischemia    Coronary artery disease Family      Past Surgical History:   Procedure Laterality Date    APPENDECTOMY         Current Outpatient Prescriptions:     amLODIPine (NORVASC) 10 mg tablet, Take 1 tablet (10 mg total) by mouth daily, Disp: 30 tablet, Rfl: 0    aspirin 325 mg tablet, Take 1 tablet by mouth daily, Disp: 1 tablet, Rfl: 0    clopidogrel (PLAVIX) 75 mg tablet, Take 1 tablet by mouth daily, Disp: 30 tablet, Rfl: 0    pravastatin (PRAVACHOL) 10 mg tablet, Take 1 tablet by mouth daily with dinner, Disp: 30 tablet, Rfl: 0    losartan (COZAAR) 50 mg tablet, Take 1 tablet (50 mg total) by mouth daily, Disp: 30 tablet, Rfl: 0    Omeprazole Magnesium (PRILOSEC OTC PO), Take by mouth daily Indications: unsure of dose , Disp: , Rfl:     pantoprazole (PROTONIX) 40 mg tablet, Take 1 tablet by mouth daily, Disp: , Rfl:   Allergies   Allergen Reactions    Co Q 10 [Coenzyme Q10] Hives    Amoxicillin     Penicillins Swelling    Statins      Leg pain    Sulfa Antibiotics Hives       Vitals:    01/30/18 1010   BP: 162/70   BP Location: Left arm   Patient Position: Sitting   Cuff Size: Large   Pulse: 88   Resp: 16   Weight: 87 5 kg (193 lb)   Height: 5' 1" (1 549 m)     Vitals:    01/30/18 1010   Weight: 87 5 kg (193 lb)        Physical Exam:  GENERAL: Alert, well appearing, and in no distress  HEENT:  PERRL, EOMI, no scleral icterus, no conjunctival pallor  NECK:  Supple, No elevated JVP, no thyromegaly, no carotid bruits  HEART:  Regular rate and rhythm, normal S1/S2, no S3/S4  Short 2/6 CHEKO  LUNGS:  Clear to auscultation bilaterally  ABDOMEN:  Obese, soft, non-tender, positive bowel sounds, no rebound or guarding  EXTREMITIES:  No edema  VASCULAR:  Normal pedal pulses   NEURO: No focal deficits,  SKIN: Normal without suspicious lesions on exposed skin    Review of Systems   Constitution: Positive for malaise/fatigue  Negative for chills, decreased appetite, diaphoresis, fever and night sweats  HENT: Negative for congestion, ear pain and hearing loss  Eyes: Negative for vision loss in left eye and vision loss in right eye  Respiratory: Positive for shortness of breath   Negative for cough, hemoptysis, snoring and wheezing  Endocrine: Negative for cold intolerance and heat intolerance  Skin: Negative for color change and rash  Musculoskeletal: Negative for arthritis, back pain, joint swelling, muscle weakness and myalgias  Gastrointestinal: Negative for bloating, abdominal pain, constipation, diarrhea, heartburn, nausea and vomiting  Genitourinary: Negative for bladder incontinence, frequency, hematuria and nocturia  Neurological: Positive for headaches  Negative for dizziness, numbness and seizures  Psychiatric/Behavioral: Negative for depression  The patient does not have insomnia  Labs:     Chemistry        Component Value Date/Time     12/03/2017 1300     05/03/2017 0739    K 3 1 (L) 12/03/2017 1300    K 3 0 (L) 05/03/2017 0739    CL 99 (L) 12/03/2017 1300     05/03/2017 0739    CO2 31 12/03/2017 1300    CO2 28 05/03/2017 0739    BUN 16 12/03/2017 1300    BUN 14 05/03/2017 0739    CREATININE 0 71 12/03/2017 1300    CREATININE 0 77 05/03/2017 0739        Component Value Date/Time    CALCIUM 9 6 12/03/2017 1300    CALCIUM 9 1 05/03/2017 0739    ALKPHOS 88 12/03/2017 1300    ALKPHOS 75 05/03/2017 0739    AST 21 12/03/2017 1300    AST 24 05/03/2017 0739    ALT 38 12/03/2017 1300    ALT 36 (H) 05/03/2017 0739    BILITOT 0 53 12/03/2017 1300    BILITOT 0 6 05/03/2017 0739            Lab Results   Component Value Date    CHOL 230 (H) 11/14/2017    CHOL 282 (H) 08/28/2017    CHOL 237 (H) 02/25/2016     Lab Results   Component Value Date    HDL 41 11/14/2017    HDL 48 (L) 08/28/2017    HDL 38 (L) 02/25/2016     Lab Results   Component Value Date    LDLCALC 162 (H) 11/14/2017     Lab Results   Component Value Date    TRIG 134 11/14/2017    TRIG 198 (H) 08/28/2017    TRIG 183 (H) 02/25/2016     No components found for: CHOLHDL    Echo:  LEFT VENTRICLE:  Systolic function was hyperdynamic  Ejection fraction was estimated in the range of 70 % to 75 %    There were no regional wall motion abnormalities  Hypertrophy was noted  There was severe concentric hypertrophy  There is obstruction at rest in the mid cavity, with a peak gradient of 23 mmHg that is likely secondary to the hypderdynamic state  Doppler parameters were consistent with abnormal left ventricular relaxation (grade 1 diastolic dysfunction)      MITRAL VALVE:  There was mild to moderate annular calcification      TRICUSPID VALVE:  There was trace regurgitation      PULMONIC VALVE:  There was trace regurgitation

## 2018-01-30 NOTE — LETTER
January 30, 2018     Amber Dominique, [facility] [address]    Patient: Andrea Tenorio   YOB: 1940   Date of Visit: 1/30/2018       Dear Dr Velasquez Issa:    Thank you for referring Paul Michaud to me for evaluation  Below are my notes for this consultation  If you have questions, please do not hesitate to call me  I look forward to following your patient along with you  Sincerely,        Veronica De La Cruz MD        CC: No Recipients  Veronica De La Cruz MD  1/30/2018 11:30 AM  Sign at close encounter                                             Cardiology Consultation     Andrea Tenorio  [id number]  1940  [address]  [address]      1  Hypertension, unspecified type  Aldosterone    Aldosterone   2  Hyperlipidemia, unspecified hyperlipidemia type     3  LVH (left ventricular hypertrophy)         Discussion/Summary:    Pleasant 60-year-old female who has concentric hypertrophy secondary to longstanding hypertension  No evidence of hypertrophic cardiomyopathy  Despite severe LVH, only grade 1 diastolic dysfunction noted on the echo, and she does not have clinically any significant volume overload on exam     We had long discussion today regarding her blood pressure control  She has seen a new primary care physician yesterday with a good plan outlined for control of her blood pressure  I agree with the initiation of the losartan  I think the combination of losartan and amlodipine start would be fine for blood pressure control  Her PCP indicated that switching to a beta blocker may be preferred  I am not too concerned with the hyperdynamic function, so strictly speaking I don't think she absolutely needs a beta-blocker  Whatever works best to control her blood pressure is fine   If a beta blocker is to be used for hypertension, I would recommend trying carvedilol or labetalol which both have some alpha blocking component to them in addition to beta-blocker and tend to be better blood pressure medications and metoprolol  She is not taking doxazosin, was never filled  She follows a low sodium diet already, and this was reviewed with her  The 1 additional testing I will recommend will be all bowel strong testing  She has hypokalemia with unclear etiology  She has also been hypernatremic in the past   With accelerated hypertension, hyper aldosterone state is certainly possible  If this is the case, then targeted therapy with Aldactone would be reasonable, and if there is an exogenous source, then identification of this would also be important  In this regard, she also has follow-up with a nephrologist planned  Overall, I cautioned her against having too many people make changes to her medications at the same time  As such, I'm not going to make any changes today  She's already purchased a home near [place], [place]  She will be moving in April  Given this, and plans for follow up with nephrology and her PCP with ambulatory BP monitoring already scheduled, I have not made a follow up appointment with me  If she has any changes, new symptoms, or concerns, she can certainly make a new appointment at any time  History of Present Illness:  64-year-old female  She has longstanding hypertension which has been somewhat difficult to control  In November of this year, she suffered a CVA  Has recovered well since then  Since that time, she continues to struggle a bit with blood pressure  She saw her neurologist in follow-up  She was recommended to have evaluation with us given findings on echocardiogram done in evaluation of a CVA  Three EF was hyperdynamic  Severe concentric hypertrophy  Mid cavitary obstruction which was mild at 23 mm of mercury which was secondary to hyperdynamic function and LVH  No evidence of hypertrophic cardiomyopathy      There have been some changes made to her medications, but despite this, blood pressure continues to be elevated  She saw a new primary care provider just yesterday, and I reviewed the notes with the patient  She has a good plan outlined with regards to treatment of her blood pressure  The patient takes blood pressure at home with a home cuff which has been correlated with her visiting nurse previously  Blood pressure is quite labile  She also has hypokalemia noted on blood work previously  Sodium similarly was high when checked in May of 2017  She was prescribed losartan yesterday, but has not yet picked this up  Blood work was also ordered, but she has not gotten this done just in case we want to add anything to that  She denies any chest pain or palpitations  She does feel some shortness of breath  Otherwise, she is not significantly active, but denies any major limitations  Patient Active Problem List   Diagnosis    HTN (hypertension)    HLD (hyperlipidemia)    Hypokalemia    Right sided sciatica    Acute CVA [facility]cerebrovascular accident) ([facility] [address] )    LVH (left ventricular hypertrophy)     Past Medical History:   Diagnosis Date    Abdominal bloating     Resolved - 9/15/16    Adverse effect of drug therapy     Last assessed - 1/4/13    Allergic rhinitis     Arthralgia     Last assessed - 5/15/17    Dysuria     Last assessed - 2/22/16    GERD (gastroesophageal reflux disease)     Headache     Hyperlipidemia     Hypertension     Left flank pain     Last assessed - 2/22/16    Muscle pain     Last assessed - 5/15/17    Subclinical hypothyroidism     Last assessed - 5/1/17    Urgency of urination     Last assessed - 2/22/16    Urinary frequency     Last assessed - 11/16/12     Social History     Social History    Marital status: Single     Spouse name: N/A    Number of children: N/A    Years of education: N/A     Occupational History    Not on file       Social History Main Topics    Smoking status: Former Smoker    Smokeless tobacco: Never Used      Comment: Never a smoker per [employer]     Alcohol use No    Drug use: No    Sexual activity: Not on file     Other Topics Concern    Not on file     Social History Narrative    Caffeine use          Family History   Problem Relation Age of Onset    Emphysema Mother     Hypertension Mother     Other Father      Myocardial Ischemia    Coronary artery disease Family      Past Surgical History:   Procedure Laterality Date    APPENDECTOMY         Current Outpatient Prescriptions:     amLODIPine (NORVASC) 10 mg tablet, Take 1 tablet (10 mg total) by mouth daily, Disp: 30 tablet, Rfl: 0    aspirin 325 mg tablet, Take 1 tablet by mouth daily, Disp: 1 tablet, Rfl: 0    clopidogrel (PLAVIX) 75 mg tablet, Take 1 tablet by mouth daily, Disp: 30 tablet, Rfl: 0    pravastatin (PRAVACHOL) 10 mg tablet, Take 1 tablet by mouth daily with dinner, Disp: 30 tablet, Rfl: 0    losartan (COZAAR) 50 mg tablet, Take 1 tablet (50 mg total) by mouth daily, Disp: 30 tablet, Rfl: 0    Omeprazole Magnesium (PRILOSEC OTC PO), Take by mouth daily Indications: unsure of dose , Disp: , Rfl:     pantoprazole (PROTONIX) 40 mg tablet, Take 1 tablet by mouth daily, Disp: , Rfl:   Allergies   Allergen Reactions    Co Q 10 [Coenzyme Q10] Hives    Amoxicillin     Penicillins Swelling    Statins      Leg pain    Sulfa Antibiotics Hives       Vitals:    01/30/18 1010   BP: 162/70   BP Location: Left arm   Patient Position: Sitting   Cuff Size: Large   Pulse: 88   Resp: 16   Weight: 87 5 kg (193 lb)   Height: 5' 1" (1 549 m)     Vitals:    01/30/18 1010   Weight: 87 5 kg (193 lb)        Physical Exam:  GENERAL: Alert, well appearing, and in no distress  HEENT:  PERRL, EOMI, no scleral icterus, no conjunctival pallor  NECK:  Supple, No elevated JVP, no thyromegaly, no carotid bruits  HEART:  Regular rate and rhythm, normal S1/S2, no S3/S4  Short 2/6 CHEKO    LUNGS:  Clear to auscultation bilaterally  ABDOMEN:  Obese, soft, non-tender, positive bowel sounds, no rebound or guarding  EXTREMITIES:  No edema  VASCULAR:  Normal pedal pulses   NEURO: No focal deficits,  SKIN: Normal without suspicious lesions on exposed skin    Review of Systems   Constitution: Positive for malaise/fatigue  Negative for chills, decreased appetite, diaphoresis, fever and night sweats  HENT: Negative for congestion, ear pain and hearing loss  Eyes: Negative for vision loss in left eye and vision loss in right eye  Respiratory: Positive for shortness of breath  Negative for cough, hemoptysis, snoring and wheezing  Endocrine: Negative for cold intolerance and heat intolerance  Skin: Negative for color change and rash  Musculoskeletal: Negative for arthritis, back pain, joint swelling, muscle weakness and myalgias  Gastrointestinal: Negative for bloating, abdominal pain, constipation, diarrhea, heartburn, nausea and vomiting  Genitourinary: Negative for bladder incontinence, frequency, hematuria and nocturia  Neurological: Positive for headaches  Negative for dizziness, numbness and seizures  Psychiatric/Behavioral: Negative for depression  The patient does not have insomnia          Labs:     Chemistry        Component Value Date/Time     12/03/2017 1300     05/03/2017 0739    K 3 1 (L) 12/03/2017 1300    K 3 0 (L) 05/03/2017 0739    CL 99 (L) 12/03/2017 1300     05/03/2017 0739    CO2 31 12/03/2017 1300    CO2 28 05/03/2017 0739    BUN 16 12/03/2017 1300    BUN 14 05/03/2017 0739    CREATININE 0 71 12/03/2017 1300    CREATININE 0 77 05/03/2017 0739        Component Value Date/Time    CALCIUM 9 6 12/03/2017 1300    CALCIUM 9 1 05/03/2017 0739    ALKPHOS 88 12/03/2017 1300    ALKPHOS 75 05/03/2017 0739    AST 21 12/03/2017 1300    AST 24 05/03/2017 0739    ALT 38 12/03/2017 1300    ALT 36 (H) 05/03/2017 0739    BILITOT 0 53 12/03/2017 1300    BILITOT 0 6 05/03/2017 0739            Lab Results   Component Value Date    CHOL 230 (H) 11/14/2017    CHOL 282 (H) 08/28/2017    CHOL 237 (H) 02/25/2016     Lab Results   Component Value Date    HDL 41 11/14/2017    HDL 48 (L) 08/28/2017    HDL 38 (L) 02/25/2016     Lab Results   Component Value Date    LDLCALC 162 (H) 11/14/2017     Lab Results   Component Value Date    TRIG 134 11/14/2017    TRIG 198 (H) 08/28/2017    TRIG 183 (H) 02/25/2016     No components found for: CHOLHDL    Echo:  LEFT VENTRICLE:  Systolic function was hyperdynamic  Ejection fraction was estimated in the range of 70 % to 75 %  There were no regional wall motion abnormalities  Hypertrophy was noted  There was severe concentric hypertrophy  There is obstruction at rest in the mid cavity, with a peak gradient of 23 mmHg that is likely secondary to the hypderdynamic state  Doppler parameters were consistent with abnormal left ventricular relaxation (grade 1 diastolic dysfunction)      MITRAL VALVE:  There was mild to moderate annular calcification      TRICUSPID VALVE:  There was trace regurgitation      PULMONIC VALVE:  There was trace regurgitation

## 2018-02-02 LAB
ALBUMIN SERPL-MCNC: 4.4 G/DL (ref 3.6–5.1)
ALBUMIN/GLOB SERPL: 1.5 (CALC) (ref 1–2.5)
ALP SERPL-CCNC: 92 U/L (ref 33–130)
ALT SERPL-CCNC: 27 U/L (ref 6–29)
AST SERPL-CCNC: 19 U/L (ref 10–35)
BASOPHILS # BLD AUTO: 26 CELLS/UL (ref 0–200)
BASOPHILS NFR BLD AUTO: 0.3 %
BILIRUB SERPL-MCNC: 0.6 MG/DL (ref 0.2–1.2)
BUN SERPL-MCNC: 19 MG/DL (ref 7–25)
BUN/CREAT SERPL: ABNORMAL (CALC) (ref 6–22)
CALCIUM SERPL-MCNC: 9.7 MG/DL (ref 8.6–10.4)
CHLORIDE SERPL-SCNC: 101 MMOL/L (ref 98–110)
CHOLEST SERPL-MCNC: 285 MG/DL
CHOLEST/HDLC SERPL: 6.1 (CALC)
CO2 SERPL-SCNC: 26 MMOL/L (ref 20–31)
CREAT SERPL-MCNC: 0.83 MG/DL (ref 0.6–0.93)
EOSINOPHIL # BLD AUTO: 220 CELLS/UL (ref 15–500)
EOSINOPHIL NFR BLD AUTO: 2.5 %
ERYTHROCYTE [DISTWIDTH] IN BLOOD BY AUTOMATED COUNT: 12.3 % (ref 11–15)
GLOBULIN SER CALC-MCNC: 2.9 G/DL (CALC) (ref 1.9–3.7)
GLUCOSE SERPL-MCNC: 101 MG/DL (ref 65–99)
HBA1C MFR BLD: 5.7 % OF TOTAL HGB
HCT VFR BLD AUTO: 41.2 % (ref 35–45)
HDLC SERPL-MCNC: 47 MG/DL
HGB BLD-MCNC: 14.1 G/DL (ref 11.7–15.5)
LDLC SERPL CALC-MCNC: 201 MG/DL (CALC)
LYMPHOCYTES # BLD AUTO: 1778 CELLS/UL (ref 850–3900)
LYMPHOCYTES NFR BLD AUTO: 20.2 %
MCH RBC QN AUTO: 30.1 PG (ref 27–33)
MCHC RBC AUTO-ENTMCNC: 34.2 G/DL (ref 32–36)
MCV RBC AUTO: 87.8 FL (ref 80–100)
MONOCYTES # BLD AUTO: 933 CELLS/UL (ref 200–950)
MONOCYTES NFR BLD AUTO: 10.6 %
NEUTROPHILS # BLD AUTO: 5843 CELLS/UL (ref 1500–7800)
NEUTROPHILS NFR BLD AUTO: 66.4 %
NONHDLC SERPL-MCNC: 238 MG/DL (CALC)
PLATELET # BLD AUTO: 305 THOUSAND/UL (ref 140–400)
PMV BLD REES-ECKER: 11.4 FL (ref 7.5–12.5)
POTASSIUM SERPL-SCNC: 4.5 MMOL/L (ref 3.5–5.3)
PROT SERPL-MCNC: 7.3 G/DL (ref 6.1–8.1)
RBC # BLD AUTO: 4.69 MILLION/UL (ref 3.8–5.1)
SL AMB EGFR AFRICAN AMERICAN: 79 ML/MIN/1.73M2
SL AMB EGFR NON AFRICAN AMERICAN: 68 ML/MIN/1.73M2
SODIUM SERPL-SCNC: 139 MMOL/L (ref 135–146)
TRIGL SERPL-MCNC: 190 MG/DL
TSH SERPL-ACNC: 2.43 MIU/L (ref 0.4–4.5)
WBC # BLD AUTO: 8.8 THOUSAND/UL (ref 3.8–10.8)

## 2018-02-04 LAB — ALDOST SERPL-MCNC: 5 NG/DL

## 2018-02-08 NOTE — PROGRESS NOTES
Assessment/Plan:    Essential hypertension  Better controlled, 24hr BP monitor showed overall average of 132 x 61, daytime average was 130/62, nighttime average was 137/58  I recommended a sleep study which she wants to think about at this time  Specially because of multiple awakenings during nighttime, daytime fatigue, and that nighttime blood pressures did not dip as expected  Otherwise most readings have been within normal limits  We will increase the losartan dose and reduce the amlodipine dose  If that combination helps her pedal edema, and reduce side effects due to amlodipine with a good blood pressure control, this could be continued  Her potassium has improved since starting the losartan  Aldosterone level was not elevated    Acute CVA (cerebrovascular accident) ([address] )  Continue blood pressure control, did not tolerate statin, continue Plavix    Negative FIT 8/17, refer for mammo, DEXA     Diagnoses and all orders for this visit:    Essential hypertension  -     amLODIPine (NORVASC) 5 mg tablet; Take 1 tablet (5 mg total) by mouth daily    Acute CVA (cerebrovascular accident) ([address] )  -     clopidogrel (PLAVIX) 75 mg tablet; Take 1 tablet (75 mg total) by mouth daily    LVH (left ventricular hypertrophy)    Hyperlipidemia, unspecified hyperlipidemia type    Hypertensive left ventricular hypertrophy, without heart failure  -     losartan (COZAAR) 100 MG tablet; Take 1 tablet (100 mg total) by mouth daily    Screening for malignant neoplasm of breast  -     Mammo screening bilateral w cad; Future    Osteoporosis screening  -     DXA bone density spine hip and pelvis; Future          Subjective:   Chief Complaint   Patient presents with    Follow-up     2-3 weeks     Hypertension        Patient ID: True Dunlap is a 69 y o  female  She comes in for a follow up of HTN, hyperlipidemia, CVA    BP readings are better, most home readings area 130-140   Pedal edema improved, but still has some dry mouth which she thinks is due to amlodipine  Wants to get off it    Overall feeling much better  The following portions of the patient's history were reviewed and updated as appropriate: current medications, past social history, past surgical history and problem list     Review of Systems   Constitutional: Negative for fatigue, fever and unexpected weight change  HENT: Negative for ear pain, hearing loss and sore throat  Eyes: Negative for pain and discharge  Respiratory: Negative for cough, chest tightness and shortness of breath  Cardiovascular: Negative for chest pain and palpitations  Gastrointestinal: Negative for abdominal pain, blood in stool, constipation, diarrhea and nausea  Genitourinary: Negative for dysuria, frequency and hematuria  Musculoskeletal: Negative for arthralgias and joint swelling  Skin: Negative for rash  Allergic/Immunologic: Negative for immunocompromised state  Neurological: Negative for dizziness and headaches  Hematological: Negative for adenopathy  Psychiatric/Behavioral: Negative for confusion and sleep disturbance  Objective:  BP (!) 180/80 (BP Location: Right arm, Patient Position: Sitting, Cuff Size: Standard)   Pulse 88   Temp 98 °F (36 7 °C)   Resp 15   Ht 5' 1" (1 549 m)   Wt 87 6 kg (193 lb 3 2 oz)   BMI 36 50 kg/m²      Physical Exam   Constitutional: She appears well-developed and well-nourished  HENT:   Head: Normocephalic and atraumatic  Nose: Nose normal    Mouth/Throat: Oropharynx is clear and moist    Eyes: Conjunctivae are normal  Pupils are equal, round, and reactive to light  Right eye exhibits no discharge  Neck: Normal range of motion  Neck supple  No thyromegaly present  Cardiovascular: Normal rate, regular rhythm, S1 normal, S2 normal and normal pulses  PMI is not displaced  No murmur heard  Pulmonary/Chest: Breath sounds normal  No accessory muscle usage  No apnea  No respiratory distress  She has no rhonchi  She has no rales  Abdominal: Soft  Normal appearance and bowel sounds are normal  She exhibits no shifting dullness  There is no hepatosplenomegaly  There is no tenderness  There is no rebound and no CVA tenderness  Musculoskeletal: Normal range of motion  She exhibits no edema or tenderness  Lymphadenopathy:     She has no cervical adenopathy  Neurological: She is alert  Skin: Skin is warm and intact  No rash noted  Psychiatric: She has a normal mood and affect  Her speech is normal    Nursing note and vitals reviewed

## 2018-02-12 ENCOUNTER — CLINICAL SUPPORT (OUTPATIENT)
Dept: INTERNAL MEDICINE CLINIC | Facility: CLINIC | Age: 78
End: 2018-02-12
Payer: COMMERCIAL

## 2018-02-12 VITALS — SYSTOLIC BLOOD PRESSURE: 186 MMHG | DIASTOLIC BLOOD PRESSURE: 88 MMHG

## 2018-02-12 DIAGNOSIS — E78.5 HYPERLIPIDEMIA: ICD-10-CM

## 2018-02-12 DIAGNOSIS — I10 ESSENTIAL HYPERTENSION: Primary | ICD-10-CM

## 2018-02-12 PROCEDURE — 99211 OFF/OP EST MAY X REQ PHY/QHP: CPT

## 2018-02-13 ENCOUNTER — OFFICE VISIT (OUTPATIENT)
Dept: INTERNAL MEDICINE CLINIC | Facility: CLINIC | Age: 78
End: 2018-02-13
Payer: COMMERCIAL

## 2018-02-13 VITALS
HEART RATE: 88 BPM | TEMPERATURE: 98 F | WEIGHT: 193.2 LBS | BODY MASS INDEX: 36.47 KG/M2 | DIASTOLIC BLOOD PRESSURE: 80 MMHG | RESPIRATION RATE: 15 BRPM | HEIGHT: 61 IN | SYSTOLIC BLOOD PRESSURE: 180 MMHG

## 2018-02-13 DIAGNOSIS — I51.7 LVH (LEFT VENTRICULAR HYPERTROPHY): ICD-10-CM

## 2018-02-13 DIAGNOSIS — Z12.39 SCREENING FOR MALIGNANT NEOPLASM OF BREAST: ICD-10-CM

## 2018-02-13 DIAGNOSIS — Z13.820 OSTEOPOROSIS SCREENING: ICD-10-CM

## 2018-02-13 DIAGNOSIS — I11.9 HYPERTENSIVE LEFT VENTRICULAR HYPERTROPHY, WITHOUT HEART FAILURE: ICD-10-CM

## 2018-02-13 DIAGNOSIS — I63.9 ACUTE CVA (CEREBROVASCULAR ACCIDENT) (HCC): ICD-10-CM

## 2018-02-13 DIAGNOSIS — I10 ESSENTIAL HYPERTENSION: Primary | ICD-10-CM

## 2018-02-13 DIAGNOSIS — E78.5 HYPERLIPIDEMIA, UNSPECIFIED HYPERLIPIDEMIA TYPE: ICD-10-CM

## 2018-02-13 PROBLEM — E87.6 HYPOKALEMIA: Status: RESOLVED | Noted: 2017-11-13 | Resolved: 2018-02-13

## 2018-02-13 PROCEDURE — 99214 OFFICE O/P EST MOD 30 MIN: CPT | Performed by: INTERNAL MEDICINE

## 2018-02-13 RX ORDER — AMLODIPINE BESYLATE 5 MG/1
5 TABLET ORAL DAILY
Qty: 30 TABLET | Refills: 1 | Status: SHIPPED | OUTPATIENT
Start: 2018-02-13 | End: 2018-03-27 | Stop reason: ALTCHOICE

## 2018-02-13 RX ORDER — CLOPIDOGREL BISULFATE 75 MG/1
75 TABLET ORAL DAILY
Qty: 30 TABLET | Refills: 2 | Status: SHIPPED | OUTPATIENT
Start: 2018-02-13 | End: 2018-02-15 | Stop reason: SDUPTHER

## 2018-02-13 RX ORDER — LOSARTAN POTASSIUM 100 MG/1
100 TABLET ORAL DAILY
Qty: 30 TABLET | Refills: 2 | Status: SHIPPED | OUTPATIENT
Start: 2018-02-13 | End: 2018-04-26 | Stop reason: SDUPTHER

## 2018-02-13 NOTE — ASSESSMENT & PLAN NOTE
Better controlled, 24hr BP monitor showed overall average of 132 x 61, daytime average was 130/62, nighttime average was 137/58  I recommended a sleep study which she wants to think about at this time  Specially because of multiple awakenings during nighttime, daytime fatigue, and that nighttime blood pressures did not dip as expected  Otherwise most readings have been within normal limits  We will increase the losartan dose and reduce the amlodipine dose  If that combination helps her pedal edema, and reduce side effects due to amlodipine with a good blood pressure control, this could be continued  Her potassium has improved since starting the losartan    Aldosterone level was not elevated

## 2018-02-15 DIAGNOSIS — I63.9 ACUTE CVA (CEREBROVASCULAR ACCIDENT) (HCC): ICD-10-CM

## 2018-02-15 RX ORDER — CLOPIDOGREL BISULFATE 75 MG/1
TABLET ORAL
Qty: 30 TABLET | Refills: 0 | Status: SHIPPED | OUTPATIENT
Start: 2018-02-15 | End: 2018-06-04 | Stop reason: SDUPTHER

## 2018-02-21 ENCOUNTER — HOSPITAL ENCOUNTER (OUTPATIENT)
Dept: BONE DENSITY | Facility: MEDICAL CENTER | Age: 78
Discharge: HOME | End: 2018-02-21
Payer: COMMERCIAL

## 2018-02-21 ENCOUNTER — HOSPITAL ENCOUNTER (OUTPATIENT)
Dept: MAMMOGRAPHY | Facility: MEDICAL CENTER | Age: 78
Discharge: HOME | End: 2018-02-21
Payer: COMMERCIAL

## 2018-02-21 DIAGNOSIS — Z12.39 SCREENING FOR MALIGNANT NEOPLASM OF BREAST: ICD-10-CM

## 2018-02-21 DIAGNOSIS — Z13.820 OSTEOPOROSIS SCREENING: ICD-10-CM

## 2018-02-21 PROCEDURE — 77080 DXA BONE DENSITY AXIAL: CPT

## 2018-02-21 PROCEDURE — 77067 SCR MAMMO BI INCL CAD: CPT

## 2018-02-22 ENCOUNTER — TELEPHONE (OUTPATIENT)
Dept: INTERNAL MEDICINE CLINIC | Facility: CLINIC | Age: 78
End: 2018-02-22

## 2018-02-22 NOTE — TELEPHONE ENCOUNTER
Phone call from Sebastian Kohli this morning c/o bilateral leg pain  She stated that once her losartan potassium was increased to 100mg she started having a lot of pain in her legs and needed an earlier appointment to discuss with you  I scheduled her for this Monday 2/26/2018

## 2018-02-26 ENCOUNTER — OFFICE VISIT (OUTPATIENT)
Dept: INTERNAL MEDICINE CLINIC | Facility: CLINIC | Age: 78
End: 2018-02-26
Payer: COMMERCIAL

## 2018-02-26 ENCOUNTER — TELEPHONE (OUTPATIENT)
Dept: INTERNAL MEDICINE CLINIC | Facility: CLINIC | Age: 78
End: 2018-02-26

## 2018-02-26 VITALS
SYSTOLIC BLOOD PRESSURE: 148 MMHG | WEIGHT: 187.2 LBS | DIASTOLIC BLOOD PRESSURE: 80 MMHG | BODY MASS INDEX: 35.34 KG/M2 | HEART RATE: 103 BPM | RESPIRATION RATE: 14 BRPM | HEIGHT: 61 IN | TEMPERATURE: 98.7 F

## 2018-02-26 DIAGNOSIS — I10 ESSENTIAL HYPERTENSION: ICD-10-CM

## 2018-02-26 DIAGNOSIS — M25.552 ACUTE HIP PAIN, LEFT: Primary | ICD-10-CM

## 2018-02-26 PROCEDURE — 99214 OFFICE O/P EST MOD 30 MIN: CPT | Performed by: INTERNAL MEDICINE

## 2018-02-26 RX ORDER — TRAMADOL HYDROCHLORIDE 50 MG/1
50 TABLET ORAL 2 TIMES DAILY PRN
Qty: 20 TABLET | Refills: 0 | Status: SHIPPED | OUTPATIENT
Start: 2018-02-26 | End: 2018-03-13 | Stop reason: SINTOL

## 2018-02-26 RX ORDER — NAPROXEN 500 MG/1
500 TABLET ORAL 2 TIMES DAILY PRN
Qty: 30 TABLET | Refills: 1 | Status: SHIPPED | OUTPATIENT
Start: 2018-02-26 | End: 2018-03-02 | Stop reason: ALTCHOICE

## 2018-02-26 NOTE — TELEPHONE ENCOUNTER
----- Message from Frank العراقي MD sent at 2/22/2018 12:12 PM EST -----  Just wanted to inform her that the breast center will contact her to schedule her for further imaging, no need to worry yet, it just means that they need more imaging to see the area better

## 2018-02-27 NOTE — PROGRESS NOTES
Assessment/Plan:    No problem-specific Assessment & Plan notes found for this encounter  Evaluate for cause of pain, get an x-ray, NSAIDs for the pain  I asked her to stop the ibuprofen and the Toradol that she was taking  Normal renal function  His pain not controlled with naproxen, use tramadol  Follow-up in 2 weeks already planned  Depending on x-ray and pain control, referred to Orthopedics  Blood pressure is slightly elevated, increase dose to losartan, monitor at home   Diagnoses and all orders for this visit:    Acute hip pain, left  -     XR hip/pelv 2-3 vws left if performed; Future  -     naproxen (NAPROSYN) 500 mg tablet; Take 1 tablet (500 mg total) by mouth 2 (two) times a day as needed for mild pain or moderate pain  -     traMADol (ULTRAM) 50 mg tablet; Take 1 tablet (50 mg total) by mouth 2 (two) times a day as needed for severe pain    Essential hypertension          Subjective:   Chief Complaint   Patient presents with    Leg Pain     Left        Patient ID: Shabana Denny is a 69 y o  female  She comes in for an acute appointment  She notes that she has had acute pain in left hip and leg     She notes that the pain is so severe that she is having difficulty walking  Pain is worse on weight-bearing  She has been taking about 9-10 tablets of 200 mg of ibuprofen  He she had some leftover Toradol from her previous physician    She is taking 5 mg of amlodipine and 50 mg losartan  She plans to increase the dose to 100 mg of losartan  She has not monitored her blood pressure s at home recently      Leg Pain          The following portions of the patient's history were reviewed and updated as appropriate: current medications, past family history, past social history and past surgical history  Review of Systems   Constitutional: Negative for fatigue, fever and unexpected weight change  HENT: Negative for ear pain, hearing loss and sore throat      Eyes: Negative for pain and discharge  Respiratory: Negative for cough, chest tightness and shortness of breath  Cardiovascular: Negative for chest pain and palpitations  Gastrointestinal: Negative for abdominal pain, blood in stool, constipation, diarrhea and nausea  Genitourinary: Negative for dysuria, frequency and hematuria  Musculoskeletal: Positive for arthralgias and gait problem  Negative for joint swelling  Skin: Negative for rash  Allergic/Immunologic: Negative for immunocompromised state  Neurological: Negative for dizziness and headaches  Hematological: Negative for adenopathy  Psychiatric/Behavioral: Negative for confusion and sleep disturbance  Objective:  /80 (BP Location: Left arm, Patient Position: Sitting, Cuff Size: Standard)   Pulse 103   Temp 98 7 °F (37 1 °C)   Resp 14   Ht 5' 1" (1 549 m)   Wt 84 9 kg (187 lb 3 2 oz)   BMI 35 37 kg/m²      Physical Exam   Constitutional: She appears well-developed and well-nourished  HENT:   Head: Normocephalic and atraumatic  Nose: Nose normal    Mouth/Throat: Oropharynx is clear and moist    Eyes: Conjunctivae are normal  Pupils are equal, round, and reactive to light  Right eye exhibits no discharge  Neck: Normal range of motion  Neck supple  No thyromegaly present  Cardiovascular: Normal rate, regular rhythm, S1 normal, S2 normal and normal pulses  PMI is not displaced  No murmur heard  Pulmonary/Chest: Breath sounds normal  No accessory muscle usage  No apnea  No respiratory distress  She has no rhonchi  She has no rales  Abdominal: Soft  Normal appearance and bowel sounds are normal  She exhibits no shifting dullness  There is no hepatosplenomegaly  There is no tenderness  There is no rebound and no CVA tenderness  Musculoskeletal: Normal range of motion  She exhibits no edema  Left hip: She exhibits tenderness and bony tenderness     Tenderness anteriorly, on the greater trochanter, pain on flexion, abduction, external rotation, mild discomfort on adduction   Lymphadenopathy:     She has no cervical adenopathy  Neurological: She is alert  Skin: Skin is warm and intact  No rash noted  Psychiatric: She has a normal mood and affect  Her speech is normal    Nursing note and vitals reviewed

## 2018-02-28 ENCOUNTER — APPOINTMENT (OUTPATIENT)
Dept: RADIOLOGY | Facility: MEDICAL CENTER | Age: 78
End: 2018-02-28
Payer: COMMERCIAL

## 2018-02-28 ENCOUNTER — TRANSCRIBE ORDERS (OUTPATIENT)
Dept: ADMINISTRATIVE | Facility: HOSPITAL | Age: 78
End: 2018-02-28

## 2018-02-28 DIAGNOSIS — M25.552 ACUTE HIP PAIN, LEFT: ICD-10-CM

## 2018-02-28 PROCEDURE — 73502 X-RAY EXAM HIP UNI 2-3 VIEWS: CPT

## 2018-03-01 ENCOUNTER — TELEPHONE (OUTPATIENT)
Dept: INTERNAL MEDICINE CLINIC | Facility: CLINIC | Age: 78
End: 2018-03-01

## 2018-03-01 NOTE — TELEPHONE ENCOUNTER
Phone call from Tamiko Bowie, tramadol is making her nauseous and she vomited one time  Naproxen is not helping her she still has a lot of pain and is unable to sleep  She wants to know what you can recommend for her

## 2018-03-02 DIAGNOSIS — M25.552 ACUTE HIP PAIN, LEFT: Primary | ICD-10-CM

## 2018-03-02 DIAGNOSIS — M25.552 LEFT HIP PAIN: Primary | ICD-10-CM

## 2018-03-02 RX ORDER — KETOROLAC TROMETHAMINE 10 MG/1
10 TABLET, FILM COATED ORAL 3 TIMES DAILY PRN
Qty: 20 TABLET | Refills: 0 | Status: SHIPPED | OUTPATIENT
Start: 2018-03-02 | End: 2018-03-13 | Stop reason: ALTCHOICE

## 2018-03-02 RX ORDER — OXYCODONE HYDROCHLORIDE AND ACETAMINOPHEN 5; 325 MG/1; MG/1
1 TABLET ORAL EVERY 8 HOURS PRN
Qty: 21 TABLET | Refills: 0 | Status: SHIPPED | OUTPATIENT
Start: 2018-03-02 | End: 2018-03-02 | Stop reason: ALTCHOICE

## 2018-03-02 NOTE — TELEPHONE ENCOUNTER
X-ray results still not in, radiology department called  For for the weekend will prescribe some Percocet to help with symptomatic relief of the pain  Stop the tramadol and does take Percocet with caution for drowsiness    Will contact her on Monday based on x-ray results about next step of action

## 2018-03-05 ENCOUNTER — TELEPHONE (OUTPATIENT)
Dept: INTERNAL MEDICINE CLINIC | Facility: CLINIC | Age: 78
End: 2018-03-05

## 2018-03-05 DIAGNOSIS — M25.552 ACUTE HIP PAIN, LEFT: Primary | ICD-10-CM

## 2018-03-05 NOTE — TELEPHONE ENCOUNTER
----- Message from Claudene Hammans, MD sent at 3/5/2018  8:23 AM EST -----  Her hip x-ray showed significant osteoarthritis  Is her pain any better  If not then I would recommend to see ortho  They may discuss about injection vs surgery?

## 2018-03-13 ENCOUNTER — OFFICE VISIT (OUTPATIENT)
Dept: INTERNAL MEDICINE CLINIC | Facility: CLINIC | Age: 78
End: 2018-03-13
Payer: COMMERCIAL

## 2018-03-13 VITALS
HEIGHT: 61 IN | TEMPERATURE: 98.2 F | DIASTOLIC BLOOD PRESSURE: 80 MMHG | SYSTOLIC BLOOD PRESSURE: 170 MMHG | HEART RATE: 98 BPM | WEIGHT: 187.8 LBS | RESPIRATION RATE: 15 BRPM | BODY MASS INDEX: 35.45 KG/M2

## 2018-03-13 DIAGNOSIS — I10 ESSENTIAL HYPERTENSION: ICD-10-CM

## 2018-03-13 DIAGNOSIS — E78.5 HYPERLIPIDEMIA, UNSPECIFIED HYPERLIPIDEMIA TYPE: ICD-10-CM

## 2018-03-13 DIAGNOSIS — M54.16 LUMBAR RADICULOPATHY: Primary | ICD-10-CM

## 2018-03-13 DIAGNOSIS — Z23 NEED FOR VACCINATION FOR STREP PNEUMONIAE: ICD-10-CM

## 2018-03-13 DIAGNOSIS — M16.12 OSTEOARTHRITIS OF LEFT HIP, UNSPECIFIED OSTEOARTHRITIS TYPE: ICD-10-CM

## 2018-03-13 PROCEDURE — 90732 PPSV23 VACC 2 YRS+ SUBQ/IM: CPT

## 2018-03-13 PROCEDURE — G0009 ADMIN PNEUMOCOCCAL VACCINE: HCPCS

## 2018-03-13 PROCEDURE — G0439 PPPS, SUBSEQ VISIT: HCPCS | Performed by: INTERNAL MEDICINE

## 2018-03-13 PROCEDURE — 99214 OFFICE O/P EST MOD 30 MIN: CPT | Performed by: INTERNAL MEDICINE

## 2018-03-13 RX ORDER — GABAPENTIN 100 MG/1
100 CAPSULE ORAL 3 TIMES DAILY
Qty: 90 CAPSULE | Refills: 0 | Status: SHIPPED | OUTPATIENT
Start: 2018-03-13 | End: 2018-03-27 | Stop reason: SDUPTHER

## 2018-03-14 PROBLEM — M54.16 LUMBAR RADICULOPATHY: Status: ACTIVE | Noted: 2018-03-14

## 2018-03-14 NOTE — PROGRESS NOTES
Assessment/Plan:    Lumbar radiculopathy  I suspect her symptoms of leg pain, back tenderness are due to L4-L5 radiculopathy  Will get an MRI of the lumbar spine  She has an appointment with Orthopedics  Her hip x-ray showed significant osteoarthritis as well  Start gabapentin 100 mg at bedtime and slowly increase to 300 mg at bedtime as tolerated  Essential hypertension  Not completely well controlled but I suspect that the pain is worsening it  I offered to start a beta-blocker instead of amlodipine or may be due diltiazem  At this time she does not want to change her blood pressure medications shins she knows that she is allergic to a lot of medications and would not like to take 2 different medications at the same time  She will continue to monitor her blood pressure at home and call us if his readings continue to be significantly elevated    HLD (hyperlipidemia)  She has not tolerated a statin in the past   She is taking Co Q10  Continue Plavix for secondary prevention of CVA  Will discuss with Neurology if there is any benefit of trying PCSk9 inhibitor, not much data on statin intolerant patient's    DEXA scan was normal, she needs further diagnostic mammography  Fit test was negative in August of 2017  Up-to-date on Prevnar, Pneumovax given today  Up-to-date on influenza for this year  She has a living will that states that she does not want cardiac resuscitation or intubation to prolong life  Diagnoses and all orders for this visit:    Lumbar radiculopathy  -     MRI lumbar spine wo contrast; Future  -     gabapentin (NEURONTIN) 100 mg capsule;  Take 1 capsule (100 mg total) by mouth 3 (three) times a day    Essential hypertension    Hyperlipidemia, unspecified hyperlipidemia type    Osteoarthritis of left hip, unspecified osteoarthritis type    Need for vaccination for Strep pneumoniae  -     PNEUMOCOCCAL POLYSACCHARIDE VACCINE 23-VALENT =>1YO SQ IM          Subjective:   Chief Complaint Patient presents with    Medicare Wellness Visit        Patient ID: Alexia Schofield is a 69 y o  female  She comes in for follow-up of hypertension, prior CVA, left hip and leg pain  Her blood pressures were slightly elevated since she stopped the amlodipine  Now she is back on 5 mg of amlodipine along with 100 mg of losartan and has had some stability in his in home readings  She is hesitant in to stop amlodipine and start a different agent to control her blood pressure  She denies any chest pain or shortness of breath  She does experience a dry mouth which she attributes to the amlodipine since it had completely resolved when she had stopped the amlodipine  Left hip is somewhat improved  But she has tremendous pain in her leg  She is finding it difficult to walk  Pain is worse at night and unable to sleep  Pain is not related to exertion  Taking Plavix  Was unable to tolerate any statin        The following portions of the patient's history were reviewed and updated as appropriate: current medications, past medical history, past social history and past surgical history  Review of Systems   Constitutional: Negative for fatigue, fever and unexpected weight change  HENT: Negative for ear pain, hearing loss and sore throat  Eyes: Negative for pain and discharge  Respiratory: Negative for cough, chest tightness and shortness of breath  Cardiovascular: Negative for chest pain and palpitations  Gastrointestinal: Negative for abdominal pain, blood in stool, constipation, diarrhea and nausea  Genitourinary: Negative for dysuria, frequency and hematuria  Musculoskeletal: Positive for arthralgias and back pain  Negative for joint swelling  Skin: Negative for rash  Allergic/Immunologic: Negative for immunocompromised state  Neurological: Positive for numbness  Negative for dizziness and headaches  Hematological: Negative for adenopathy     Psychiatric/Behavioral: Negative for confusion and sleep disturbance  Objective:  /80 (BP Location: Left arm, Patient Position: Sitting, Cuff Size: Standard)   Pulse 98   Temp 98 2 °F (36 8 °C)   Resp 15   Ht 5' 1" (1 549 m)   Wt 85 2 kg (187 lb 12 8 oz)   BMI 35 48 kg/m²      Physical Exam   Constitutional: She appears well-developed and well-nourished  HENT:   Head: Normocephalic and atraumatic  Nose: Nose normal    Mouth/Throat: Oropharynx is clear and moist    Eyes: Conjunctivae are normal  Pupils are equal, round, and reactive to light  Right eye exhibits no discharge  Neck: Normal range of motion  Neck supple  No thyromegaly present  Cardiovascular: Normal rate, regular rhythm, S1 normal, S2 normal and normal pulses  PMI is not displaced  No murmur heard  Pulmonary/Chest: Breath sounds normal  No accessory muscle usage  No apnea  No respiratory distress  She has no rhonchi  She has no rales  Abdominal: Soft  Normal appearance and bowel sounds are normal  She exhibits no shifting dullness  There is no hepatosplenomegaly  There is no tenderness  There is no rebound and no CVA tenderness  Musculoskeletal: Normal range of motion  She exhibits no edema or tenderness  Lymphadenopathy:     She has no cervical adenopathy  Neurological: She is alert  She has normal strength  A sensory deficit is present  Reflex Scores:       Patellar reflexes are 1+ on the right side and 0 on the left side  Achilles reflexes are 1+ on the right side and 1+ on the left side  Decreased sensation on left anterior shin and anterior thigh  Skin: Skin is warm and intact  No rash noted  Psychiatric: She has a normal mood and affect  Her speech is normal    Nursing note and vitals reviewed

## 2018-03-14 NOTE — PROGRESS NOTES
HPI:  Agustín Bullock is a 69 y o  female here for her Initial Wellness Visit      Patient Active Problem List   Diagnosis    Essential hypertension    HLD (hyperlipidemia)    Right sided sciatica    Acute CVA (cerebrovascular accident) ([address] )    LVH (left ventricular hypertrophy)    Lumbar radiculopathy     Past Medical History:   Diagnosis Date    Abdominal bloating     Resolved - 9/15/16    Adverse effect of drug therapy     Last assessed - 1/4/13    Allergic rhinitis     Arthralgia     Last assessed - 5/15/17    Dysuria     Last assessed - 2/22/16    GERD (gastroesophageal reflux disease)     Headache     Hyperlipidemia     Hypertension     Left flank pain     Last assessed - 2/22/16    Muscle pain     Last assessed - 5/15/17    Subclinical hypothyroidism     Last assessed - 5/1/17    Urgency of urination     Last assessed - 2/22/16    Urinary frequency     Last assessed - 11/16/12     Past Surgical History:   Procedure Laterality Date    APPENDECTOMY       Family History   Problem Relation Age of Onset    Emphysema Mother     Hypertension Mother     Other Father      Myocardial Ischemia    Coronary artery disease Family      History   Smoking Status    Former Smoker   Smokeless Tobacco    Never Used     Comment: Never a smoker per [employer]      History   Alcohol Use No      History   Drug Use No     /80 (BP Location: Left arm, Patient Position: Sitting, Cuff Size: Standard)   Pulse 98   Temp 98 2 °F (36 8 °C)   Resp 15   Ht 5' 1" (1 549 m)   Wt 85 2 kg (187 lb 12 8 oz)   BMI 35 48 kg/m²       Current Outpatient Prescriptions   Medication Sig Dispense Refill    amLODIPine (NORVASC) 5 mg tablet Take 1 tablet (5 mg total) by mouth daily 30 tablet 1    clopidogrel (PLAVIX) 75 mg tablet TAKE 1 TABLET BY MOUTH EVERY DAY IN THE EVENING 30 tablet 0    losartan (COZAAR) 100 MG tablet Take 1 tablet (100 mg total) by mouth daily 30 tablet 2    Omeprazole Magnesium (PRILOSEC OTC PO) Take by mouth daily Indications: unsure of dose   pantoprazole (PROTONIX) 40 mg tablet Take 1 tablet by mouth daily      gabapentin (NEURONTIN) 100 mg capsule Take 1 capsule (100 mg total) by mouth 3 (three) times a day 90 capsule 0     No current facility-administered medications for this visit        Allergies   Allergen Reactions    Co Q 10 [Coenzyme Q10] Hives    Amoxicillin     Penicillins Swelling    Statins      Leg pain    Sulfa Antibiotics Hives    Tramadol Other (See Comments)     Constipation     Immunization History   Administered Date(s) Administered    Pneumococcal Conjugate 13-Valent 05/16/2016    Pneumococcal Polysaccharide PPV23 03/13/2018       Patient Care Team:  Jose Rucker MD as PCP - General (Internal Medicine)    Medicare Screening Tests and Risk Assessments:  AWV Clinical     ISAR:   Previous hospitalizations?:  Yes   How many hospitalizations have you had in the last year?:  1-2       Once in a Lifetime Medicare Screening:       Medicare Screening Tests and Risk Assessment:   AAA Risk Assessment    Osteoporosis Risk Assessment    HIV Risk Assessment        Drug and Alcohol Use:   Tobacco use    Cigarettes:  former smoker    Tobacco use duration    Tobacco Cessation Readiness    Alcohol use    Alcohol use:  never    Alcohol Treatment Readiness   Illicit Drug Use    Drug use:  never        Diet & Exercise:   Diet   What is your diet?:  No Added Salt, Limited junk food   How many servings a day of the following:   Fruits and Vegetables:  3-4 Meat:  1-2   Whole Grains:  1 Simple Carbs:  1   Dairy:  1    Exercise    Do you currently exercise?:  currently not exercising       Cognitive Impairment Screening:   Depression screening preformed:  Yes     PHQ-9 Depression scale score:  0   Cognitive Impairment Screening    Do you have difficulty learning or retaining new information?:  No Do you have difficulty handling new tasks?:  No    Do you have difficulty with spatial ability and orientation?:  No   Do you have difficulty with language?:  No Do you have difficulty with behavior?:  No       Functional Ability/Level of Safety:   Hearing    Hearing difficulties:  No Bilateral:  normal   Left:  normal Right:  normal   Hearing aid:  No    Hearing Impairment Assessment    Current Activities    Help needed with the folllowing:    Using the phone:  No Transportation:  No   Shopping:  No Preparing Meals:  No   Doing Housework:  No Doing Laundry:  No   Managing Medications:  No Managing Money:  No   ADL    Fall Risk   Have you fallen in the last 12 months?:  No    Injury History       Home Safety:   Home Safety Risk Factors       Advanced Directives:   Advanced Directives    Living Will:  Yes Durable POA for healthcare: Yes   Advanced directive:  No    Patient's End of Life Decisions        Urinary Incontinence:   Do you have urinary incontinence?:  No        Glaucoma:            Provider Screening     Preventative Screening/Counseling:   Cardiovascular Screening/Counseling:   (Labs Q5 years, EKG optional one-time)   General:  Screening Current           Diabetes Screening/Counseling:   (2 tests/year if Pre-Diabetes or 1 test/year if no Diabetes)   General:  Screening Current           Colorectal Cancer Screening/Counseling:   (FOBT Q1 yr; Flex Sig Q4 yrs or Q10 yrs after Screening Colonoscopy; Screening Colonoscpy Q2 yrs High Risk or Q10 yrs Low Risk; Barium Enema Q2 yrs High Risk or Q4 yrs Low Risk)   General:  Screening Current           Prostate Cancer Screening/Counseling:   (Annual)          Breast Cancer Screening/Counseling:   (Baseline Age 28 - 43; Annual Age 36+)   General:  Risks and Benefits Discussed Due for: Studies:  mammogram         Cervical Cancer Screening/Counseling:   (Annual for High Risk or Childbearing Age with Abnormal Pap in Last 3 yrs;  Every 2 all others)   General:  Screening Not Indicated           Osteoporosis Screening/Counseling:   (Every 2 Yrs if at risk or more if medically necessary)   General:  Screening Current           AAA Screening/Counseling:   (Once per Lifetime with risk factors)    General:  Screening Not Indicated           Glaucoma Screening/Counseling:   (Annual)   General:  Risks and Benefits Discussed          HIV Screening/Counseling:   (Voluntary; Once annually for high risk OR 3 times for Pregnancy at diagnosis of IUP; 3rd trimester; and at Labor   General:  Screening Not Indicated           Hepatitis C Screening:             Immunizations:   Influenza (annual): Influenza UTD This Year   Pneumococcal (Once in a Lifetime):  Pneumococcal Due Today   Hepatitis B Series (low risk patients):  Series Not Indicated   Zostavax (Medicare D Coverage, Pt >70 yo):  Risks & Benefits Discussed       Other Preventative Couseling (Non-Medicare Wellness Visit Required): Increased physical activity counseling given       Referrals (Non-Medicare Wellness Visit Required):       Medical Equipment/Suppliers:           No exam data present    Physical Exam :  Physical Exam    Reviewed Updated [facility] Prior Wellness Visits:   Last Medicare wellness visit information was reviewed, patient interviewed , no change since last AWVno  Last Medicare wellness visit information was reviewed, patient interviewed and updates made to the record today no    Assessment and Plan:  1  Lumbar radiculopathy  MRI lumbar spine wo contrast    gabapentin (NEURONTIN) 100 mg capsule   2  Essential hypertension     3  Hyperlipidemia, unspecified hyperlipidemia type     4  Osteoarthritis of left hip, unspecified osteoarthritis type     5   Need for vaccination for Strep pneumoniae  PNEUMOCOCCAL POLYSACCHARIDE VACCINE 23-VALENT =>1YO SQ IM       Health Maintenance Due   Topic Date Due    DTaP,Tdap,and Td Vaccines (1 - Tdap) 07[birth year]/1962    GLAUCOMA SCREENING 67+ YR  07/16/2008    INFLUENZA VACCINE  09/01/2017

## 2018-03-14 NOTE — ASSESSMENT & PLAN NOTE
She has not tolerated a statin in the past   She is taking Co Q10  Continue Plavix for secondary prevention of CVA    Will discuss with Neurology if there is any benefit of trying PCSk9 inhibitor, not much data on statin intolerant patient's

## 2018-03-14 NOTE — ASSESSMENT & PLAN NOTE
I suspect her symptoms of leg pain, back tenderness are due to L4-L5 radiculopathy  Will get an MRI of the lumbar spine  She has an appointment with Orthopedics  Her hip x-ray showed significant osteoarthritis as well  Start gabapentin 100 mg at bedtime and slowly increase to 300 mg at bedtime as tolerated

## 2018-03-14 NOTE — ASSESSMENT & PLAN NOTE
Not completely well controlled but I suspect that the pain is worsening it  I offered to start a beta-blocker instead of amlodipine or may be due diltiazem  At this time she does not want to change her blood pressure medications shins she knows that she is allergic to a lot of medications and would not like to take 2 different medications at the same time    She will continue to monitor her blood pressure at home and call us if his readings continue to be significantly elevated

## 2018-03-15 ENCOUNTER — OFFICE VISIT (OUTPATIENT)
Dept: OBGYN CLINIC | Facility: MEDICAL CENTER | Age: 78
End: 2018-03-15
Payer: COMMERCIAL

## 2018-03-15 ENCOUNTER — APPOINTMENT (OUTPATIENT)
Dept: RADIOLOGY | Facility: CLINIC | Age: 78
End: 2018-03-15
Payer: COMMERCIAL

## 2018-03-15 VITALS
DIASTOLIC BLOOD PRESSURE: 82 MMHG | WEIGHT: 187.4 LBS | BODY MASS INDEX: 35.38 KG/M2 | HEART RATE: 83 BPM | HEIGHT: 61 IN | SYSTOLIC BLOOD PRESSURE: 158 MMHG

## 2018-03-15 DIAGNOSIS — M70.62 GREATER TROCHANTERIC BURSITIS, LEFT: ICD-10-CM

## 2018-03-15 DIAGNOSIS — M25.552 ACUTE HIP PAIN, LEFT: ICD-10-CM

## 2018-03-15 DIAGNOSIS — M54.16 LUMBAR RADICULOPATHY: ICD-10-CM

## 2018-03-15 DIAGNOSIS — M54.5 LOW BACK PAIN, UNSPECIFIED BACK PAIN LATERALITY, UNSPECIFIED CHRONICITY, WITH SCIATICA PRESENCE UNSPECIFIED: ICD-10-CM

## 2018-03-15 DIAGNOSIS — M54.5 LOW BACK PAIN, UNSPECIFIED BACK PAIN LATERALITY, UNSPECIFIED CHRONICITY, WITH SCIATICA PRESENCE UNSPECIFIED: Primary | ICD-10-CM

## 2018-03-15 PROCEDURE — 99204 OFFICE O/P NEW MOD 45 MIN: CPT | Performed by: ORTHOPAEDIC SURGERY

## 2018-03-15 PROCEDURE — 72100 X-RAY EXAM L-S SPINE 2/3 VWS: CPT

## 2018-03-15 NOTE — PROGRESS NOTES
Assessment/Plan     1  Low back pain, unspecified back pain laterality, unspecified chronicity, with sciatica presence unspecified    2  Acute hip pain, left    3  Lumbar radiculopathy    4  Greater trochanteric bursitis, left      Orders Placed This Encounter   Procedures    XR spine lumbar 2 or 3 views injury    Ambulatory referral to Physical Therapy     Patient has some left hip arthritis, tenderness over her left trochanteric bursa and left SI joint  She also has pain over the anterior aspect of her shin which I think is likely from pathology in her lumbar spine  The pain in her lower leg has been relieved with Gabapentin  She has good strength in this leg  I think it is best that she proceeds with physical therapy and taking Tylenol as needed for this  She will continue with gabapentin  She will monitor her leg pain  When she returns in 4-6 weeks we may consider an MRI of her lumbar spine if her pain returns  In regards to her left hip she will go to physical therapy and take Tylenol as needed for pain control  She will monitor her symptoms  We may consider injections in the future  Her son was here today  Both the patient and her son agree with the plan  Return in about 6 weeks (around 4/26/2018)  History of Present Illness   Chief complaint: No chief complaint on file  c/o L hip and leg pain    HPI: Harper Avalos is a 69 y o  female that c/o left hip pain  She has had pain for the past 3-4 weeks  She denies trauma  She admits pain over her left lower back and groin  She also notes having pain over the anterior aspect of her lower leg  She denies radiation of her hip pain  She describes as a dull, achy pain  She overall feels sore and uncomfortable  She saw her primary care physician who gave her gabapentin this very much helped the anterior lower leg pain  She also notes having some numbness in the same area for the past week  She denies any recent bowel or bladder changes    She has not done physical therapy  She has not had hip her back injections  She has not had surgery on her hip her back  She has tried naproxen and tramadol without relief  She has constipation with tramadol  She does not want any narcotics  ROS:    See HPI for musculoskeletal review  All other systems reviewed are negative     Historical Information   Past Medical History:   Diagnosis Date    Abdominal bloating     Resolved - 9/15/16    Adverse effect of drug therapy     Last assessed - 1/4/13    Allergic rhinitis     Arthralgia     Last assessed - 5/15/17    Dysuria     Last assessed - 2/22/16    GERD (gastroesophageal reflux disease)     Headache     Hyperlipidemia     Hypertension     Left flank pain     Last assessed - 2/22/16    Muscle pain     Last assessed - 5/15/17    Subclinical hypothyroidism     Last assessed - 5/1/17    Urgency of urination     Last assessed - 2/22/16    Urinary frequency     Last assessed - 11/16/12     Past Surgical History:   Procedure Laterality Date    APPENDECTOMY       Social History   History   Alcohol Use No     History   Drug Use No     History   Smoking Status    Former Smoker   Smokeless Tobacco    Never Used     Comment: Never a smoker per [employer]      Family History:   Family History   Problem Relation Age of Onset    Emphysema Mother     Hypertension Mother     Other Father      Myocardial Ischemia    Coronary artery disease Family        Meds/Allergies     (Not in a hospital admission)  Allergies   Allergen Reactions    Co Q 10 [Coenzyme Q10] Hives    Amoxicillin     Penicillins Swelling    Statins      Leg pain    Sulfa Antibiotics Hives    Tramadol Other (See Comments)     Constipation       Objective   Vitals: Blood pressure 158/82, pulse 83, height 5' 1" (1 549 m), weight 85 kg (187 lb 6 4 oz)  ,Body mass index is 35 41 kg/m²      PE:  AAOx 3  WDWN  Hearing intact, no drainage from eyes  Regular rate  no audible wheezing  no abdominal distension  LE compartments soft, skin intact    left hip:   No dislocation/deformity  Mildly Limited ROM  + TTP over greater trochanter  + TTP over SIJ  + Miguel A Gram test  AT/GS/EHL/quads/hamstrings/iliopsoas 5/5    Back:    No TTP over lumbar spinous processes, paraspinal musculature  Negative SLR      Imaging Studies: I have personally reviewed pertinent films in PACS   XR L hip:  Moderate to severe DJD  X-ray lumbar spine:  Multilevel degenerative changes with L5-S1 DDD

## 2018-03-20 ENCOUNTER — HOSPITAL ENCOUNTER (OUTPATIENT)
Dept: MAMMOGRAPHY | Facility: CLINIC | Age: 78
Discharge: HOME | End: 2018-03-20
Payer: COMMERCIAL

## 2018-03-20 DIAGNOSIS — R92.0 MAMMOGRAPHIC MICROCALCIFICATION: ICD-10-CM

## 2018-03-20 PROCEDURE — 77065 DX MAMMO INCL CAD UNI: CPT

## 2018-03-20 NOTE — PROGRESS NOTES
Met with patient and Dr Marianna Jackson regarding recommendation for;      _____ RIGHT __x____LEFT      _____Ultrasound guided  ___x___Stereotactic  Breast biopsy  __x___Verbalized understanding        Blood thinners:  __x___yes _____no    Date stopped: ____n/a_______    Biopsy teaching sheet given:  ___x____yes ______no

## 2018-03-23 ENCOUNTER — HOSPITAL ENCOUNTER (OUTPATIENT)
Dept: MAMMOGRAPHY | Facility: CLINIC | Age: 78
Discharge: HOME | End: 2018-03-23

## 2018-03-27 ENCOUNTER — OFFICE VISIT (OUTPATIENT)
Dept: INTERNAL MEDICINE CLINIC | Facility: CLINIC | Age: 78
End: 2018-03-27
Payer: COMMERCIAL

## 2018-03-27 VITALS
SYSTOLIC BLOOD PRESSURE: 192 MMHG | RESPIRATION RATE: 15 BRPM | DIASTOLIC BLOOD PRESSURE: 80 MMHG | TEMPERATURE: 98 F | HEIGHT: 61 IN | WEIGHT: 188.4 LBS | HEART RATE: 80 BPM | BODY MASS INDEX: 35.57 KG/M2

## 2018-03-27 DIAGNOSIS — K21.9 GASTROESOPHAGEAL REFLUX DISEASE, ESOPHAGITIS PRESENCE NOT SPECIFIED: ICD-10-CM

## 2018-03-27 DIAGNOSIS — M54.16 LUMBAR RADICULOPATHY: ICD-10-CM

## 2018-03-27 DIAGNOSIS — R92.8 ABNORMAL MAMMOGRAM OF LEFT BREAST: ICD-10-CM

## 2018-03-27 DIAGNOSIS — I51.7 LVH (LEFT VENTRICULAR HYPERTROPHY): ICD-10-CM

## 2018-03-27 DIAGNOSIS — I10 ESSENTIAL HYPERTENSION: Primary | ICD-10-CM

## 2018-03-27 DIAGNOSIS — M16.12 PRIMARY OSTEOARTHRITIS OF LEFT HIP: ICD-10-CM

## 2018-03-27 PROCEDURE — 99214 OFFICE O/P EST MOD 30 MIN: CPT | Performed by: INTERNAL MEDICINE

## 2018-03-27 RX ORDER — GABAPENTIN 300 MG/1
300 CAPSULE ORAL
Qty: 90 CAPSULE | Refills: 0 | Status: SHIPPED | OUTPATIENT
Start: 2018-03-27 | End: 2018-06-04 | Stop reason: SDUPTHER

## 2018-03-27 RX ORDER — OMEPRAZOLE 40 MG/1
40 CAPSULE, DELAYED RELEASE ORAL DAILY
Qty: 90 CAPSULE | Refills: 0 | Status: SHIPPED | OUTPATIENT
Start: 2018-03-27 | End: 2018-06-04 | Stop reason: SDUPTHER

## 2018-03-27 RX ORDER — LABETALOL 100 MG/1
100 TABLET, FILM COATED ORAL 2 TIMES DAILY
Qty: 60 TABLET | Refills: 1 | Status: SHIPPED | OUTPATIENT
Start: 2018-03-27 | End: 2018-04-12 | Stop reason: SINTOL

## 2018-03-27 RX ORDER — GABAPENTIN 100 MG/1
100 CAPSULE ORAL 2 TIMES DAILY
Qty: 180 CAPSULE | Refills: 0 | Status: SHIPPED | OUTPATIENT
Start: 2018-03-27

## 2018-03-27 NOTE — ASSESSMENT & PLAN NOTE
Improve leg pain on gabapentin  Take 100 mg in the morning and afternoon and 300 mg at bedtime, may help her sleep better as well

## 2018-03-27 NOTE — ASSESSMENT & PLAN NOTE
Advised to schedule the biopsy  She is concerned of hip pain and inability to lay flat due to that  She is going to think about it and call the breast Center  I offered her to referred to see a breast surgeon, if need be the biopsy could be done in the OR  Diagnostic mammography and ultrasound showed a BI-RADS 4 suspicious lesion

## 2018-03-27 NOTE — ASSESSMENT & PLAN NOTE
Symptoms seem to be better controlled on omeprazole as opposed to Protonix    Will switch back to omeprazole

## 2018-03-27 NOTE — PROGRESS NOTES
Assessment/Plan:    Essential hypertension  Not well controlled  She notes itching and hoarse voice along with some shortness of breath whenever she takes amlodipine  We will stop that and switched to labetalol  Continue losartan which has been helping her potassium and she has been tolerating that well without any difficulty  Lumbar radiculopathy  Improve leg pain on gabapentin  Take 100 mg in the morning and afternoon and 300 mg at bedtime, may help her sleep better as well  Primary osteoarthritis of left hip  Follow-up appointment with Orthopedics, to discuss steroid injection worse is hip replacement which she is not in favor off  Continue Tylenol, may use ibuprofen if needed once acid reflux is well controlled    Abnormal mammogram of left breast  Advised to schedule the biopsy  She is concerned of hip pain and inability to lay flat due to that  She is going to think about it and call the breast Center  I offered her to referred to see a breast surgeon, if need be the biopsy could be done in the OR  Diagnostic mammography and ultrasound showed a BI-RADS 4 suspicious lesion  Gastroesophageal reflux disease  Symptoms seem to be better controlled on omeprazole as opposed to Protonix  Will switch back to omeprazole    DEXA scan was normal, she needs further diagnostic mammography  Fit test was negative in August of 2017  Up-to-date on Prevnar, Pneumovax given last visit  Up-to-date on influenza for this year  She has a living will that states that she does not want cardiac resuscitation or intubation to prolong life  Diagnoses and all orders for this visit:    Essential hypertension  -     labetalol (NORMODYNE) 100 mg tablet; Take 1 tablet (100 mg total) by mouth 2 (two) times a day    LVH (left ventricular hypertrophy)  -     labetalol (NORMODYNE) 100 mg tablet;  Take 1 tablet (100 mg total) by mouth 2 (two) times a day    Lumbar radiculopathy  -     gabapentin (NEURONTIN) 300 mg capsule; Take 1 capsule (300 mg total) by mouth daily at bedtime  -     gabapentin (NEURONTIN) 100 mg capsule; Take 1 capsule (100 mg total) by mouth 2 (two) times a day    Primary osteoarthritis of left hip    Abnormal mammogram of left breast    Gastroesophageal reflux disease, esophagitis presence not specified  -     omeprazole (PriLOSEC) 40 MG capsule; Take 1 capsule (40 mg total) by mouth daily          Subjective:   Chief Complaint   Patient presents with    Follow-up     2 weeks    Leg Pain     Bilateral         Patient ID: Nola Jay is a 69 y o  female  She comes in for follow-up of hypertension, bilateral leg pain, hip pain, history of CVA, acid reflux    Blood pressures range anywhere between 130-160  When upset or has pain may go up to 302-005 systolic  Wants to stop the amlodipine since she thinks that the cause of her hoarse voice, shortness of breath and itching  Last visit she was hesitant to do that since gabapentin was going to be started and she did not [place] start to new medications  Leg pain is significantly improved  She is taking about 4 capsules of 100 mg of, gabapentin with decent response  She continues to have the hip pain  Notes that acid reflux is not well controlled and was better when she was on omeprazole      Leg Pain          The following portions of the patient's history were reviewed and updated as appropriate: current medications, past medical history, past social history and past surgical history      PHQ-9 Depression Screening    PHQ-9:    Frequency of the following problems over the past two weeks:                Current Outpatient Prescriptions:     clopidogrel (PLAVIX) 75 mg tablet, TAKE 1 TABLET BY MOUTH EVERY DAY IN THE EVENING, Disp: 30 tablet, Rfl: 0    gabapentin (NEURONTIN) 100 mg capsule, Take 1 capsule (100 mg total) by mouth 2 (two) times a day, Disp: 180 capsule, Rfl: 0    losartan (COZAAR) 100 MG tablet, Take 1 tablet (100 mg total) by mouth daily, Disp: 30 tablet, Rfl: 2    gabapentin (NEURONTIN) 300 mg capsule, Take 1 capsule (300 mg total) by mouth daily at bedtime, Disp: 90 capsule, Rfl: 0    labetalol (NORMODYNE) 100 mg tablet, Take 1 tablet (100 mg total) by mouth 2 (two) times a day, Disp: 60 tablet, Rfl: 1    omeprazole (PriLOSEC) 40 MG capsule, Take 1 capsule (40 mg total) by mouth daily, Disp: 90 capsule, Rfl: 0    Review of Systems   Constitutional: Negative for fatigue, fever and unexpected weight change  HENT: Negative for ear pain, hearing loss and sore throat  Eyes: Negative for pain and discharge  Respiratory: Negative for cough, chest tightness and shortness of breath  Cardiovascular: Negative for chest pain and palpitations  Gastrointestinal: Negative for abdominal pain, blood in stool, constipation, diarrhea and nausea  Genitourinary: Negative for dysuria, frequency and hematuria  Musculoskeletal: Positive for arthralgias  Negative for joint swelling  Skin: Negative for rash  Allergic/Immunologic: Negative for immunocompromised state  Neurological: Negative for dizziness and headaches  Hematological: Negative for adenopathy  Psychiatric/Behavioral: Positive for sleep disturbance  Negative for confusion  Objective:  BP (!) 192/80 (BP Location: Left arm, Patient Position: Sitting, Cuff Size: Standard)   Pulse 80   Temp 98 °F (36 7 °C)   Resp 15   Ht 5' 1" (1 549 m)   Wt 85 5 kg (188 lb 6 4 oz)   BMI 35 60 kg/m²      Physical Exam   Constitutional: She appears well-developed and well-nourished  HENT:   Head: Normocephalic and atraumatic  Nose: Nose normal    Mouth/Throat: Oropharynx is clear and moist    Eyes: Conjunctivae are normal  Pupils are equal, round, and reactive to light  Right eye exhibits no discharge  Neck: Normal range of motion  Neck supple  No thyromegaly present  Cardiovascular: Normal rate, regular rhythm, S1 normal, S2 normal and normal pulses  PMI is not displaced      No murmur heard  Pulmonary/Chest: Breath sounds normal  No accessory muscle usage  No apnea  No respiratory distress  She has no rhonchi  She has no rales  Abdominal: Soft  Normal appearance and bowel sounds are normal  She exhibits no shifting dullness  There is no hepatosplenomegaly  There is no tenderness  There is no rebound and no CVA tenderness  Musculoskeletal: Normal range of motion  She exhibits no edema or tenderness  Lymphadenopathy:     She has no cervical adenopathy  Neurological: She is alert  Skin: Skin is warm and intact  No rash noted  Psychiatric: She has a normal mood and affect  Her speech is normal    Nursing note and vitals reviewed

## 2018-03-27 NOTE — ASSESSMENT & PLAN NOTE
Follow-up appointment with Orthopedics, to discuss steroid injection worse is hip replacement which she is not in favor off    Continue Tylenol, may use ibuprofen if needed once acid reflux is well controlled

## 2018-03-27 NOTE — ASSESSMENT & PLAN NOTE
Not well controlled  She notes itching and hoarse voice along with some shortness of breath whenever she takes amlodipine  We will stop that and switched to labetalol  Continue losartan which has been helping her potassium and she has been tolerating that well without any difficulty

## 2018-04-10 DIAGNOSIS — I63.9 ACUTE CVA (CEREBROVASCULAR ACCIDENT) (HCC): ICD-10-CM

## 2018-04-10 DIAGNOSIS — M54.16 LUMBAR RADICULOPATHY: ICD-10-CM

## 2018-04-10 RX ORDER — CLOPIDOGREL BISULFATE 75 MG/1
TABLET ORAL
Qty: 30 TABLET | Refills: 0 | OUTPATIENT
Start: 2018-04-10

## 2018-04-10 RX ORDER — GABAPENTIN 100 MG/1
100 CAPSULE ORAL 3 TIMES DAILY
Qty: 90 CAPSULE | Refills: 0 | OUTPATIENT
Start: 2018-04-10

## 2018-04-12 ENCOUNTER — OFFICE VISIT (OUTPATIENT)
Dept: INTERNAL MEDICINE CLINIC | Facility: CLINIC | Age: 78
End: 2018-04-12
Payer: COMMERCIAL

## 2018-04-12 VITALS
DIASTOLIC BLOOD PRESSURE: 90 MMHG | HEART RATE: 96 BPM | HEIGHT: 61 IN | TEMPERATURE: 98.1 F | BODY MASS INDEX: 35.98 KG/M2 | SYSTOLIC BLOOD PRESSURE: 180 MMHG | WEIGHT: 190.6 LBS | RESPIRATION RATE: 16 BRPM

## 2018-04-12 DIAGNOSIS — M54.16 LUMBAR RADICULOPATHY: ICD-10-CM

## 2018-04-12 DIAGNOSIS — I10 ESSENTIAL HYPERTENSION: Primary | ICD-10-CM

## 2018-04-12 PROCEDURE — 99214 OFFICE O/P EST MOD 30 MIN: CPT | Performed by: INTERNAL MEDICINE

## 2018-04-12 RX ORDER — DILTIAZEM HYDROCHLORIDE 180 MG/1
180 CAPSULE, COATED, EXTENDED RELEASE ORAL DAILY
Qty: 30 CAPSULE | Refills: 1 | Status: SHIPPED | OUTPATIENT
Start: 2018-04-12 | End: 2018-06-04 | Stop reason: SDUPTHER

## 2018-04-12 NOTE — PROGRESS NOTES
Assessment/Plan:    Essential hypertension  Blood pressure improved on recheck  She was better controlled when she was on losartan and amlodipine but amlodipine had to be discontinued due to pedal edema  Will try diltiazem instead  Will still avoid a diuretic since she had hypokalemia until an Ace inhibitor was started  If she does not tolerate diltiazem then we will attempt for a thiazide diuretic and keep a close eye on the potassium  Lumbar radiculopathy  Well controlled on the current dose of Neurontin  No need for imaging at this time       Diagnoses and all orders for this visit:    Essential hypertension  -     diltiazem (CARDIZEM CD) 180 mg 24 hr capsule; Take 1 capsule (180 mg total) by mouth daily    Lumbar radiculopathy          Subjective:   Chief Complaint   Patient presents with    Hypertension        Patient ID: Nika Mendoza is a 69 y o  female  She comes in for an acute appointment  She notes that she did not tolerate the labetalol  She has been taking losartan regularly but when she took the labetalol she felt dizzy, lightheaded, very weak  She took it for 2 days and then stopped it  The next day her blood pressure was elevated should she took it for another day and then felt extremely dizzy and had to stop it  The day after that her blood pressure was in the 446 systolic range and she took 10 mg of amlodipine  The next morning her blood pressure was 099 systolic  Last night she did not take any amlodipine in this morning her blood pressure was 460 systolic  Leg pain is significantly better  Has some discomfort on the right hip but otherwise sleeping well and able to walk      Hypertension   Pertinent negatives include no chest pain, headaches, palpitations or shortness of breath  The following portions of the patient's history were reviewed and updated as appropriate: current medications, past family history, past medical history and past social history      PHQ-9 Depression Screening    PHQ-9:    Frequency of the following problems over the past two weeks:                Current Outpatient Prescriptions:     clopidogrel (PLAVIX) 75 mg tablet, TAKE 1 TABLET BY MOUTH EVERY DAY IN THE EVENING, Disp: 30 tablet, Rfl: 0    gabapentin (NEURONTIN) 100 mg capsule, Take 1 capsule (100 mg total) by mouth 2 (two) times a day, Disp: 180 capsule, Rfl: 0    gabapentin (NEURONTIN) 300 mg capsule, Take 1 capsule (300 mg total) by mouth daily at bedtime, Disp: 90 capsule, Rfl: 0    losartan (COZAAR) 100 MG tablet, Take 1 tablet (100 mg total) by mouth daily, Disp: 30 tablet, Rfl: 2    omeprazole (PriLOSEC) 40 MG capsule, Take 1 capsule (40 mg total) by mouth daily, Disp: 90 capsule, Rfl: 0    diltiazem (CARDIZEM CD) 180 mg 24 hr capsule, Take 1 capsule (180 mg total) by mouth daily, Disp: 30 capsule, Rfl: 1    Review of Systems   Constitutional: Positive for fatigue  Negative for fever and unexpected weight change  HENT: Negative for ear pain, hearing loss and sore throat  Eyes: Negative for pain and discharge  Respiratory: Negative for cough, chest tightness and shortness of breath  Cardiovascular: Negative for chest pain and palpitations  Gastrointestinal: Negative for abdominal pain, blood in stool, constipation, diarrhea and nausea  Genitourinary: Negative for dysuria, frequency and hematuria  Musculoskeletal: Positive for arthralgias  Negative for joint swelling  Skin: Negative for rash  Allergic/Immunologic: Negative for immunocompromised state  Neurological: Negative for dizziness and headaches  Hematological: Negative for adenopathy  Psychiatric/Behavioral: Negative for confusion and sleep disturbance  Objective:  BP (!) 180/90   Pulse 96   Temp 98 1 °F (36 7 °C)   Resp 16   Ht 5' 1" (1 549 m)   Wt 86 5 kg (190 lb 9 6 oz)   BMI 36 01 kg/m²      Physical Exam   Constitutional: She appears well-developed and well-nourished     HENT:   Head: Normocephalic and atraumatic  Nose: Nose normal    Mouth/Throat: Oropharynx is clear and moist    Eyes: Conjunctivae are normal  Pupils are equal, round, and reactive to light  Right eye exhibits no discharge  Neck: Normal range of motion  Neck supple  No thyromegaly present  Cardiovascular: Normal rate, regular rhythm, S1 normal, S2 normal and normal pulses  PMI is not displaced  No murmur heard  Pulmonary/Chest: Breath sounds normal  No accessory muscle usage  No apnea  No respiratory distress  She has no rhonchi  She has no rales  Abdominal: Soft  Normal appearance and bowel sounds are normal  She exhibits no shifting dullness  There is no hepatosplenomegaly  There is no tenderness  There is no rebound and no CVA tenderness  Musculoskeletal: Normal range of motion  She exhibits no edema or tenderness  Lymphadenopathy:     She has no cervical adenopathy  Neurological: She is alert  Skin: Skin is warm and intact  No rash noted  Psychiatric: She has a normal mood and affect  Her speech is normal    Nursing note and vitals reviewed

## 2018-04-12 NOTE — ASSESSMENT & PLAN NOTE
Blood pressure improved on recheck  She was better controlled when she was on losartan and amlodipine but amlodipine had to be discontinued due to pedal edema  Will try diltiazem instead  Will still avoid a diuretic since she had hypokalemia until an Ace inhibitor was started  If she does not tolerate diltiazem then we will attempt for a thiazide diuretic and keep a close eye on the potassium

## 2018-04-26 DIAGNOSIS — I11.9 HYPERTENSIVE LEFT VENTRICULAR HYPERTROPHY, WITHOUT HEART FAILURE: ICD-10-CM

## 2018-04-26 RX ORDER — LOSARTAN POTASSIUM 100 MG/1
100 TABLET ORAL DAILY
Qty: 90 TABLET | Refills: 1 | Status: SHIPPED | OUTPATIENT
Start: 2018-04-26

## 2018-04-27 ENCOUNTER — OFFICE VISIT (OUTPATIENT)
Dept: INTERNAL MEDICINE CLINIC | Facility: CLINIC | Age: 78
End: 2018-04-27
Payer: COMMERCIAL

## 2018-04-27 VITALS
WEIGHT: 190 LBS | HEART RATE: 84 BPM | RESPIRATION RATE: 18 BRPM | DIASTOLIC BLOOD PRESSURE: 86 MMHG | BODY MASS INDEX: 35.87 KG/M2 | TEMPERATURE: 98.6 F | SYSTOLIC BLOOD PRESSURE: 170 MMHG | HEIGHT: 61 IN

## 2018-04-27 DIAGNOSIS — M54.16 LUMBAR RADICULOPATHY: ICD-10-CM

## 2018-04-27 DIAGNOSIS — R92.8 ABNORMAL MAMMOGRAM OF LEFT BREAST: ICD-10-CM

## 2018-04-27 DIAGNOSIS — I63.9 ACUTE CVA (CEREBROVASCULAR ACCIDENT) (HCC): ICD-10-CM

## 2018-04-27 DIAGNOSIS — I10 ESSENTIAL HYPERTENSION: Primary | ICD-10-CM

## 2018-04-27 DIAGNOSIS — K21.9 GASTROESOPHAGEAL REFLUX DISEASE, ESOPHAGITIS PRESENCE NOT SPECIFIED: ICD-10-CM

## 2018-04-27 PROCEDURE — 99214 OFFICE O/P EST MOD 30 MIN: CPT | Performed by: INTERNAL MEDICINE

## 2018-04-27 NOTE — ASSESSMENT & PLAN NOTE
Discussed again about the BI-RADS readings  Last diagnostic mammography showed a BI-RADS 4  She wants to do the diagnostic studies with biopsy when she moves to [place] since she will have a continual follow-up there and will have family members to support her in the process as needed

## 2018-04-27 NOTE — PROGRESS NOTES
Assessment/Plan:    Essential hypertension    Better controlled at home  Advised her to take losartan in the morning and diltiazem in the afternoon  If evening blood  Pressures continue to be elevated, could try to increase diltiazem dose  Gastroesophageal reflux disease    Better controlled with omeprazole  Acute CVA (cerebrovascular accident) ([facility] [address] )    Continue optimal blood pressure control, Plavix  Did not tolerate statin    Abnormal mammogram of left breast   Discussed again about the BI-RADS readings  Last diagnostic mammography showed a BI-RADS 4  She wants to do the diagnostic studies with biopsy when she moves to [place] since she will have a continual follow-up there and will have family members to support her in the process as needed  Lumbar radiculopathy   Continue current dose of gabapentin  Symptomatic well controlled and does not need the MRI    DEXA scan was normal, Fit test was negative in August of 2017  Up-to-date on Prevnar, Pneumovax  Up-to-date on influenza for this year  She has a living will that states that she does not want cardiac resuscitation or intubation to prolong life        Diagnoses and all orders for this visit:    Essential hypertension  -     CBC and differential  -     Comprehensive metabolic panel  -     TSH, 3rd generation with T4 reflex  -     Magnesium    [facility]  -     CBC and differential  -     Comprehensive metabolic panel  -     TSH, 3rd generation with T4 reflex  -     Magnesium    Gastroesophageal reflux disease, esophagitis presence not specified  -     CBC and differential  -     Comprehensive metabolic panel  -     TSH, 3rd generation with T4 reflex  -     Magnesium    Abnormal mammogram of left breast    Lumbar radiculopathy          Subjective:   Chief Complaint   Patient presents with    Follow-up     1 month, home blood pressures range 130-140/70-80    Constipation        Patient ID: Nika Mendoza is a 69 y o  female  She comes in for follow-up of hypertension, history of CVA, lumbar radiculopathy, abnormal mammo , GERD    Blood pressure readings at home have mostly been in the 619-877 systolic  Occasional in the evening hours notices a systolic of 874-323  Takes losartan in the morning and diltiazem at bedtime  Mild dry mouth and constipation because of diltiazem but notes the blood pressure is improved so will try to do symptomatic treatment to conquer the side effects    Planning to move to [place] in June  Left leg pain is well controlled so is acid reflux  The following portions of the patient's history were reviewed and updated as appropriate: current medications, past medical history, past social history and past surgical history  PHQ-9 Depression Screening    PHQ-9:    Frequency of the following problems over the past two weeks:                Current Outpatient Prescriptions:     clopidogrel (PLAVIX) 75 mg tablet, TAKE 1 TABLET BY MOUTH EVERY DAY IN THE EVENING, Disp: 30 tablet, Rfl: 0    diltiazem (CARDIZEM CD) 180 mg 24 hr capsule, Take 1 capsule (180 mg total) by mouth daily, Disp: 30 capsule, Rfl: 1    gabapentin (NEURONTIN) 100 mg capsule, Take 1 capsule (100 mg total) by mouth 2 (two) times a day, Disp: 180 capsule, Rfl: 0    gabapentin (NEURONTIN) 300 mg capsule, Take 1 capsule (300 mg total) by mouth daily at bedtime, Disp: 90 capsule, Rfl: 0    losartan (COZAAR) 100 MG tablet, Take 1 tablet (100 mg total) by mouth daily, Disp: 90 tablet, Rfl: 1    omeprazole (PriLOSEC) 40 MG capsule, Take 1 capsule (40 mg total) by mouth daily, Disp: 90 capsule, Rfl: 0    Review of Systems   Constitutional: Negative for fatigue, fever and unexpected weight change  HENT: Negative for ear pain, hearing loss and sore throat  Eyes: Negative for pain and discharge  Respiratory: Negative for cough, chest tightness and shortness of breath      Cardiovascular: Negative for chest pain and palpitations  Gastrointestinal: Positive for constipation  Negative for abdominal pain, blood in stool, diarrhea and nausea  Genitourinary: Negative for dysuria, frequency and hematuria  Musculoskeletal: Negative for arthralgias and joint swelling  Skin: Negative for rash  Allergic/Immunologic: Negative for immunocompromised state  Neurological: Negative for dizziness and headaches  Hematological: Negative for adenopathy  Psychiatric/Behavioral: Negative for confusion and sleep disturbance  Objective:  /86   Pulse 84   Temp 98 6 °F (37 °C) (Tympanic)   Resp 18   Ht 5' 1" (1 549 m)   Wt 86 2 kg (190 lb)   BMI 35 90 kg/m²      Physical Exam   Constitutional: She appears well-developed and well-nourished  HENT:   Head: Normocephalic and atraumatic  Right Ear: Tympanic membrane normal    Left Ear: Tympanic membrane normal    Nose: Nose normal    Mouth/Throat: Oropharynx is clear and moist  No posterior oropharyngeal edema or posterior oropharyngeal erythema  Eyes: Conjunctivae are normal  Pupils are equal, round, and reactive to light  Right eye exhibits no discharge  Neck: Normal range of motion  Neck supple  No thyromegaly present  Cardiovascular: Normal rate, regular rhythm, S1 normal, S2 normal and normal heart sounds  PMI is not displaced  No murmur heard  Pulmonary/Chest: Effort normal and breath sounds normal  No accessory muscle usage  No apnea  No respiratory distress  She has no rhonchi  She has no rales  Abdominal: Soft  Normal appearance and bowel sounds are normal  She exhibits no shifting dullness  There is no hepatosplenomegaly  There is no tenderness  There is no rebound and no CVA tenderness  Musculoskeletal: Normal range of motion  She exhibits no edema or tenderness  Lymphadenopathy:     She has no cervical adenopathy  Neurological: She is alert  Skin: Skin is warm and intact  No rash noted     Psychiatric: She has a normal mood and affect  Her speech is normal    Nursing note and vitals reviewed  No results found for this or any previous visit (from the past 1008 hour(s))  ]    Procedure: Xr Spine Lumbar 2 Or 3 Views Injury    Result Date: 3/21/2018  Narrative: LUMBAR SPINE INDICATION:   M54 5: Low back pain  COMPARISON:  None VIEWS:  XR SPINE LUMBAR 2 OR 3 VIEWS INJURY FINDINGS: Rotatory levoscoliosis of the lumbar spine noted  There is minimal grade 1 anterolisthesis at L3-L4  Multilevel degenerative disc disease of the lumbar spine is seen with some degree of narrowing at all levels, greatest degrees of narrowing at L3-L4, L5-S1  There is no evidence of acute fracture or destructive osseous lesion  The pedicles appear intact  Atherosclerotic vascular calcifications are noted  Visualized soft tissues otherwise appear unremarkable  Impression: 1  Multilevel degenerative disc disease of the lumbar spine 2  Minimal anterolisthesis at L3-L4  Rotatory levoscoliosis 3  No compression fracture Workstation performed: [id number]     Procedure: Xr Hip/pelv 2-3 Vws Left If Performed    Result Date: 3/4/2018  Narrative: LEFT HIP INDICATION:  Acute left hip pain with walking COMPARISON: None VIEWS:  XR HIP/PELV 2-3 VWS LEFT  W PELVIS IF PERFORMED FINDINGS: There is no acute fracture or dislocation  There is diffuse joint space narrowing present with moderately advanced hypertrophic changes seen in the acetabulum and femoral head  No lytic or blastic osseous lesions  Degenerative changes are also seen within the lower lumbar spine  Soft tissues unremarkable  Impression: Moderately advanced osteoarthritis left hip Workstation performed: [id number]     Procedure: Mammo Diagnostic Left W Cad    Result Date: 3/20/2018  Narrative: Patient History: Patient is postmenopausal  No known family history of cancer  No Hormone Replacement Therapy Patient's BMI is 36 5  Reason for exam: addl evaluation requested from abnormal screening   Mammo Diagnostic Left W CAD: March 20, 2018 - Check In #: [[id number]] Spot compression CC with magnification, spot compression ML with magnification, and ML view(s) were taken of the left breast  Technologist: KERRI Calderon (KERRI)(M) Prior study comparison: February 21, 2018, mammo screening bilateral W CAD, performed at [address]  April 13, 2006, bilateral screening mammogram w/CAD, performed at [address]  Diagnostic images of LEFT breast were obtained  Opacification views of the left breast microcalcification shows these to be varied in shape and size with vague tissue density in association  Recommend stereotactic core biopsy sampling  Findings and recommendation for biopsy were personally discussed with the patient in conjunction with nurse navigator Antionette Reyes  CONCLUSION: Indeterminate microcalcifications in the left breast for which stereotactic core biopsy sampling is recommended  ACR BI-RADS® Assessments: BiRad:4 - Suspicious Recommendation: Stereotactic biopsy of the left breast  Analyzed by CAD The patient is scheduled in a reminder system for screening mammography  8-10% of cancers will be missed on mammography  Management of a palpable abnormality must be based on clinical grounds  Patients will be notified of their results via letter from our facility  Accredited by [employer] of [employer] and FDA   Transcription Location: [address] [place], [place] [address]way: [id number] Risk Value(s): Tyrer-Cuzick 10 Year: 2 500%, Tyrer-Cuzick Lifetime: 2 500%, Myriad Table: 1 5%, LESLY 5 Year: 1 6%, NCI Lifetime: 3 2%

## 2018-04-27 NOTE — ASSESSMENT & PLAN NOTE
Better controlled at home  Advised her to take losartan in the morning and diltiazem in the afternoon  If evening blood  Pressures continue to be elevated, could try to increase diltiazem dose

## 2018-05-11 LAB
ALBUMIN SERPL-MCNC: 4 G/DL (ref 3.6–5.1)
ALBUMIN/GLOB SERPL: 1.5 (CALC) (ref 1–2.5)
ALP SERPL-CCNC: 100 U/L (ref 33–130)
ALT SERPL-CCNC: 16 U/L (ref 6–29)
AST SERPL-CCNC: 16 U/L (ref 10–35)
BASOPHILS # BLD AUTO: 18 CELLS/UL (ref 0–200)
BASOPHILS NFR BLD AUTO: 0.2 %
BILIRUB SERPL-MCNC: 0.5 MG/DL (ref 0.2–1.2)
BUN SERPL-MCNC: 15 MG/DL (ref 7–25)
BUN/CREAT SERPL: ABNORMAL (CALC) (ref 6–22)
CALCIUM SERPL-MCNC: 9.2 MG/DL (ref 8.6–10.4)
CHLORIDE SERPL-SCNC: 103 MMOL/L (ref 98–110)
CO2 SERPL-SCNC: 29 MMOL/L (ref 20–31)
CREAT SERPL-MCNC: 0.7 MG/DL (ref 0.6–0.93)
EOSINOPHIL # BLD AUTO: 264 CELLS/UL (ref 15–500)
EOSINOPHIL NFR BLD AUTO: 2.9 %
ERYTHROCYTE [DISTWIDTH] IN BLOOD BY AUTOMATED COUNT: 12.3 % (ref 11–15)
GLOBULIN SER CALC-MCNC: 2.6 G/DL (CALC) (ref 1.9–3.7)
GLUCOSE SERPL-MCNC: 108 MG/DL (ref 65–99)
HCT VFR BLD AUTO: 37.4 % (ref 35–45)
HGB BLD-MCNC: 12.5 G/DL (ref 11.7–15.5)
LYMPHOCYTES # BLD AUTO: 2184 CELLS/UL (ref 850–3900)
LYMPHOCYTES NFR BLD AUTO: 24 %
MAGNESIUM SERPL-MCNC: 1.4 MG/DL (ref 1.5–2.5)
MCH RBC QN AUTO: 29.6 PG (ref 27–33)
MCHC RBC AUTO-ENTMCNC: 33.4 G/DL (ref 32–36)
MCV RBC AUTO: 88.6 FL (ref 80–100)
MONOCYTES # BLD AUTO: 1037 CELLS/UL (ref 200–950)
MONOCYTES NFR BLD AUTO: 11.4 %
NEUTROPHILS # BLD AUTO: 5597 CELLS/UL (ref 1500–7800)
NEUTROPHILS NFR BLD AUTO: 61.5 %
PLATELET # BLD AUTO: 282 THOUSAND/UL (ref 140–400)
PMV BLD REES-ECKER: 11.1 FL (ref 7.5–12.5)
POTASSIUM SERPL-SCNC: 3.5 MMOL/L (ref 3.5–5.3)
PROT SERPL-MCNC: 6.6 G/DL (ref 6.1–8.1)
RBC # BLD AUTO: 4.22 MILLION/UL (ref 3.8–5.1)
SL AMB EGFR AFRICAN AMERICAN: 98 ML/MIN/1.73M2
SL AMB EGFR NON AFRICAN AMERICAN: 84 ML/MIN/1.73M2
SODIUM SERPL-SCNC: 141 MMOL/L (ref 135–146)
TSH SERPL-ACNC: 3.24 MIU/L (ref 0.4–4.5)
WBC # BLD AUTO: 9.1 THOUSAND/UL (ref 3.8–10.8)

## 2018-05-14 ENCOUNTER — TELEPHONE (OUTPATIENT)
Dept: INTERNAL MEDICINE CLINIC | Facility: CLINIC | Age: 78
End: 2018-05-14

## 2018-05-14 NOTE — TELEPHONE ENCOUNTER
----- Message from Mariposa Man MD sent at 5/11/2018  1:05 PM EDT -----  No major abnormalities on bloodwork, some minor one which I will discuss when I see her next

## 2018-06-04 ENCOUNTER — OFFICE VISIT (OUTPATIENT)
Dept: INTERNAL MEDICINE CLINIC | Facility: CLINIC | Age: 78
End: 2018-06-04
Payer: COMMERCIAL

## 2018-06-04 VITALS
DIASTOLIC BLOOD PRESSURE: 68 MMHG | SYSTOLIC BLOOD PRESSURE: 199 MMHG | BODY MASS INDEX: 36.36 KG/M2 | TEMPERATURE: 99.1 F | HEART RATE: 82 BPM | WEIGHT: 192.6 LBS | RESPIRATION RATE: 15 BRPM | HEIGHT: 61 IN

## 2018-06-04 DIAGNOSIS — E78.5 HYPERLIPIDEMIA, UNSPECIFIED HYPERLIPIDEMIA TYPE: ICD-10-CM

## 2018-06-04 DIAGNOSIS — I10 ESSENTIAL HYPERTENSION: Primary | ICD-10-CM

## 2018-06-04 DIAGNOSIS — R92.8 ABNORMAL MAMMOGRAM OF LEFT BREAST: ICD-10-CM

## 2018-06-04 DIAGNOSIS — I63.9 ACUTE CVA (CEREBROVASCULAR ACCIDENT) (HCC): ICD-10-CM

## 2018-06-04 DIAGNOSIS — I51.7 LVH (LEFT VENTRICULAR HYPERTROPHY): ICD-10-CM

## 2018-06-04 DIAGNOSIS — E83.42 HYPOMAGNESEMIA: ICD-10-CM

## 2018-06-04 DIAGNOSIS — F41.1 GENERALIZED ANXIETY DISORDER: ICD-10-CM

## 2018-06-04 DIAGNOSIS — K21.9 GASTROESOPHAGEAL REFLUX DISEASE, ESOPHAGITIS PRESENCE NOT SPECIFIED: ICD-10-CM

## 2018-06-04 DIAGNOSIS — M54.16 LUMBAR RADICULOPATHY: ICD-10-CM

## 2018-06-04 PROCEDURE — 99214 OFFICE O/P EST MOD 30 MIN: CPT | Performed by: INTERNAL MEDICINE

## 2018-06-04 RX ORDER — OMEPRAZOLE 40 MG/1
40 CAPSULE, DELAYED RELEASE ORAL DAILY
Qty: 90 CAPSULE | Refills: 0 | Status: SHIPPED | OUTPATIENT
Start: 2018-06-04

## 2018-06-04 RX ORDER — DILTIAZEM HYDROCHLORIDE 180 MG/1
180 CAPSULE, COATED, EXTENDED RELEASE ORAL DAILY
Qty: 90 CAPSULE | Refills: 0 | Status: SHIPPED | OUTPATIENT
Start: 2018-06-04

## 2018-06-04 RX ORDER — GABAPENTIN 300 MG/1
300 CAPSULE ORAL
Qty: 90 CAPSULE | Refills: 0 | Status: SHIPPED | OUTPATIENT
Start: 2018-06-04

## 2018-06-04 RX ORDER — CLOPIDOGREL BISULFATE 75 MG/1
75 TABLET ORAL EVERY EVENING
Qty: 90 TABLET | Refills: 0 | Status: SHIPPED | OUTPATIENT
Start: 2018-06-04

## 2018-06-04 RX ORDER — ALPRAZOLAM 0.25 MG/1
TABLET ORAL
Qty: 20 TABLET | Refills: 0 | Status: SHIPPED | OUTPATIENT
Start: 2018-06-04

## 2018-06-04 NOTE — ASSESSMENT & PLAN NOTE
Continues to have significant fluctuations in blood pressure  Improved on recheck during the of visit to 170/70  Most readings at home have been around 881-549 systolic  She does admit to increased anxiety due to her upcoming move to [place]  Continue losartan and diltiazem at this current dose  Try Xanax as needed for the anxiety  Continue monitoring at home and call if notices any significantly different numbers  No follow-up appointment was scheduled since she will be moving in 3 more weeks  I offered to talk to doctor in [place] if need be to explain her symptoms  Mildly low magnesium levels, likely secondary to the PP I  Recheck and supplement as needed

## 2018-06-04 NOTE — PROGRESS NOTES
Assessment/Plan:    Essential hypertension  Continues to have significant fluctuations in blood pressure  Improved on recheck during the of visit to 170/70  Most readings at home have been around [id number] systolic  She does admit to increased anxiety due to her upcoming move to [place]  Continue losartan and diltiazem at this current dose  Try Xanax as needed for the anxiety  Continue monitoring at home and call if notices any significantly different numbers  No follow-up appointment was scheduled since she will be moving in 3 more weeks  I offered to talk to doctor in [place] if need be to explain her symptoms  Mildly low magnesium levels, likely secondary to the PP I  Recheck and supplement as needed  Gastroesophageal reflux disease  Better controlled on the Prilosec  Abnormal mammogram of left breast  Plans to get the diagnostic studies in [place] so that she can have continued follow-up as needed  HLD (hyperlipidemia)  Unable to tolerate statins    Lumbar radiculopathy  Significantly improved pain with gabapentin  Takes 300 mg at bedtime, 100 mg during the daytime  Diagnoses and all orders for this visit:    Essential hypertension  -     diltiazem (CARDIZEM CD) 180 mg 24 hr capsule; Take 1 capsule (180 mg total) by mouth daily    LVH (left ventricular hypertrophy)    Hyperlipidemia, unspecified hyperlipidemia type    Generalized anxiety disorder  -     ALPRAZolam (XANAX) 0 25 mg tablet; Take 1/2 -1 tab daily as needed for anxiety    Hypomagnesemia  -     Comprehensive metabolic panel  -     Magnesium    Lumbar radiculopathy  -     gabapentin (NEURONTIN) 300 mg capsule; Take 1 capsule (300 mg total) by mouth daily at bedtime    Acute CVA (cerebrovascular accident) ([address] )  -     clopidogrel (PLAVIX) 75 mg tablet;  Take 1 tablet (75 mg total) by mouth every evening    Gastroesophageal reflux disease, esophagitis presence not specified  -     omeprazole (PriLOSEC) 40 MG capsule; Take 1 capsule (40 mg total) by mouth daily    Abnormal mammogram of left breast          Subjective:   Chief Complaint   Patient presents with    Follow-up     5 weeks    Hypertension        Patient ID: Sara Barbosa is a 69 y o  female  She comes in for follow-up of hypertension, history of CVA, lumbar radiculopathy, abnormal mammogram    Notes that blood pressure mostly around [phone] systolic  Occasionally will notice of [phone] systolic  Adjust before she came into the office visit her blood pressure was 710 systolic  No chest pain or shortness of breath or dizziness or lightheadedness  Has noticed increased anxiety  Has a lot to pack and arrange for further move  Leg pain is improved  Walking much better    Plans to see Internal Medicine or Geriatrician  Plans to get diagnostic mammography once gets to [facility]  Hypertension   Pertinent negatives include no chest pain, headaches, palpitations or shortness of breath  The following portions of the patient's history were reviewed and updated as appropriate: current medications, past medical history, past social history and past surgical history      PHQ-9 Depression Screening    PHQ-9:    Frequency of the following problems over the past two weeks:                Current Outpatient Prescriptions:     clopidogrel (PLAVIX) 75 mg tablet, Take 1 tablet (75 mg total) by mouth every evening, Disp: 90 tablet, Rfl: 0    diltiazem (CARDIZEM CD) 180 mg 24 hr capsule, Take 1 capsule (180 mg total) by mouth daily, Disp: 90 capsule, Rfl: 0    gabapentin (NEURONTIN) 100 mg capsule, Take 1 capsule (100 mg total) by mouth 2 (two) times a day, Disp: 180 capsule, Rfl: 0    gabapentin (NEURONTIN) 300 mg capsule, Take 1 capsule (300 mg total) by mouth daily at bedtime, Disp: 90 capsule, Rfl: 0    losartan (COZAAR) 100 MG tablet, Take 1 tablet (100 mg total) by mouth daily, Disp: 90 tablet, Rfl: 1    omeprazole (PriLOSEC) 40 MG capsule, Take 1 capsule (40 mg total) by mouth daily, Disp: 90 capsule, Rfl: 0    ALPRAZolam (XANAX) 0 25 mg tablet, Take 1/2 -1 tab daily as needed for anxiety, Disp: 20 tablet, Rfl: 0    Review of Systems   Constitutional: Positive for fatigue  Negative for fever and unexpected weight change  HENT: Negative for ear pain, hearing loss and sore throat  Eyes: Negative for pain and discharge  Respiratory: Negative for cough, chest tightness and shortness of breath  Cardiovascular: Negative for chest pain and palpitations  Gastrointestinal: Negative for abdominal pain, blood in stool, constipation, diarrhea and nausea  Genitourinary: Negative for dysuria, frequency and hematuria  Musculoskeletal: Negative for arthralgias and joint swelling  Skin: Negative for rash  Allergic/Immunologic: Negative for immunocompromised state  Neurological: Negative for dizziness and headaches  Hematological: Negative for adenopathy  Psychiatric/Behavioral: Negative for confusion and sleep disturbance  The patient is nervous/anxious  Objective:  BP (!) 199/68 (BP Location: Left arm, Patient Position: Sitting, Cuff Size: Standard)   Pulse 82   Temp 99 1 °F (37 3 °C)   Resp 15   Ht 5' 1" (1 549 m)   Wt 87 4 kg (192 lb 9 6 oz)   BMI 36 39 kg/m²      Physical Exam   Constitutional: She appears well-developed and well-nourished  HENT:   Head: Normocephalic and atraumatic  Right Ear: Tympanic membrane normal    Left Ear: Tympanic membrane normal    Nose: Nose normal    Mouth/Throat: Oropharynx is clear and moist  No posterior oropharyngeal edema or posterior oropharyngeal erythema  Eyes: Conjunctivae are normal  Pupils are equal, round, and reactive to light  Right eye exhibits no discharge  Neck: Normal range of motion  Neck supple  No thyromegaly present  Cardiovascular: Normal rate, regular rhythm, S1 normal, S2 normal and normal heart sounds  PMI is not displaced      No murmur heard   Pulmonary/Chest: Effort normal and breath sounds normal  No accessory muscle usage  No apnea  No respiratory distress  She has no rhonchi  She has no rales  Abdominal: Soft  Normal appearance and bowel sounds are normal  She exhibits no shifting dullness  There is no hepatosplenomegaly  There is no tenderness  There is no rebound and no CVA tenderness  Musculoskeletal: Normal range of motion  She exhibits no edema or tenderness  Lymphadenopathy:     She has no cervical adenopathy  Neurological: She is alert  Skin: Skin is warm and intact  No rash noted  Psychiatric: She has a normal mood and affect  Her speech is normal    Nursing note and vitals reviewed          Recent Results (from the past 1008 hour(s))   Magnesium    Collection Time: 05/10/18  7:25 AM   Result Value Ref Range    Magnesium, Serum 1 4 (L) 1 5 - 2 5 mg/dL   Comprehensive metabolic panel    Collection Time: 05/10/18  7:25 AM   Result Value Ref Range    SL AMB GLUCOSE 108 (H) 65 - 99 mg/dL    BUN 15 7 - 25 mg/dL    Creatinine, Serum 0 70 0 60 - 0 93 mg/dL    eGFR Non  84 > OR = 60 mL/min/1 73m2    SL AMB EGFR  98 > OR = 60 mL/min/1 73m2    SL AMB BUN/CREATININE RATIO NOT APPLICABLE 6 - 22 (calc)    SL AMB SODIUM 141 135 - 146 mmol/L    SL AMB POTASSIUM 3 5 3 5 - 5 3 mmol/L    SL AMB CHLORIDE 103 98 - 110 mmol/L    SL AMB CARBON DIOXIDE 29 20 - 31 mmol/L    SL AMB CALCIUM 9 2 8 6 - 10 4 mg/dL    SL AMB PROTEIN, TOTAL 6 6 6 1 - 8 1 g/dL    Serum Albumin 4 0 3 6 - 5 1 g/dL    SL AMB GLOBULIN 2 6 1 9 - 3 7 g/dL (calc)    SL AMB ALBUMIN/GLOBULIN RATIO 1 5 1 0 - 2 5 (calc)    SL AMB BILIRUBIN, TOTAL 0 5 0 2 - 1 2 mg/dL    SL AMB ALKALINE PHOSPHATASE 100 33 - 130 U/L    SL AMB AST 16 10 - 35 U/L    SL AMB ALT 16 6 - 29 U/L   CBC and differential    Collection Time: 05/10/18  7:25 AM   Result Value Ref Range    SL AMB LAB WHITE BLOOD CELL COUNT 9 1 3 8 - 10 8 Thousand/uL    SL AMB LAB RED BLOOD CELLS 4 22 3 80 - 5 10 Million/uL    Hemoglobin 12 5 11 7 - 15 5 g/dL    Hematocrit 37 4 35 0 - 45 0 %    MCV 88 6 80 0 - 100 0 fL    MCH 29 6 27 0 - 33 0 pg    MCHC 33 4 32 0 - 36 0 g/dL    RDW 12 3 11 0 - 15 0 %    Platelet Count 140 652 - 400 Thousand/uL    SL AMB MPV 11 1 7 5 - 12 5 fL    Neutrophils (Absolute) 5,597 1,500 - 7,800 cells/uL    Lymphocytes (Absolute) 2,184 850 - 3,900 cells/uL    Monocytes (Absolute) 1,037 (H) 200 - 950 cells/uL    Eosinophils (Absolute) 264 15 - 500 cells/uL    Basophils (Absolute) 18 0 - 200 cells/uL    Neutrophils 61 5 %    Lymphocytes 24 0 %    Monocytes 11 4 %    Eosinophils 2 9 %    Basophils 0 2 %   TSH, 3rd generation with T4 reflex    Collection Time: 05/10/18  7:25 AM   Result Value Ref Range    SL AMB TSH W/ REFLEX TO FREE T4 3 24 0 40 - 4 50 mIU/L   ]    No results found

## 2018-06-06 ENCOUNTER — TELEPHONE (OUTPATIENT)
Dept: INTERNAL MEDICINE CLINIC | Facility: CLINIC | Age: 78
End: 2018-06-06

## 2018-06-06 LAB
ALBUMIN SERPL-MCNC: 4.3 G/DL (ref 3.6–5.1)
ALBUMIN/GLOB SERPL: 1.6 (CALC) (ref 1–2.5)
ALP SERPL-CCNC: 95 U/L (ref 33–130)
ALT SERPL-CCNC: 19 U/L (ref 6–29)
AST SERPL-CCNC: 16 U/L (ref 10–35)
BILIRUB SERPL-MCNC: 0.5 MG/DL (ref 0.2–1.2)
BUN SERPL-MCNC: 15 MG/DL (ref 7–25)
BUN/CREAT SERPL: ABNORMAL (CALC) (ref 6–22)
CALCIUM SERPL-MCNC: 9.6 MG/DL (ref 8.6–10.4)
CHLORIDE SERPL-SCNC: 103 MMOL/L (ref 98–110)
CO2 SERPL-SCNC: 31 MMOL/L (ref 20–31)
CREAT SERPL-MCNC: 0.75 MG/DL (ref 0.6–0.93)
GLOBULIN SER CALC-MCNC: 2.7 G/DL (CALC) (ref 1.9–3.7)
GLUCOSE SERPL-MCNC: 101 MG/DL (ref 65–99)
MAGNESIUM SERPL-MCNC: 1.7 MG/DL (ref 1.5–2.5)
POTASSIUM SERPL-SCNC: 4.1 MMOL/L (ref 3.5–5.3)
PROT SERPL-MCNC: 7 G/DL (ref 6.1–8.1)
SL AMB EGFR AFRICAN AMERICAN: 90 ML/MIN/1.73M2
SL AMB EGFR NON AFRICAN AMERICAN: 77 ML/MIN/1.73M2
SODIUM SERPL-SCNC: 142 MMOL/L (ref 135–146)

## 2018-06-06 NOTE — TELEPHONE ENCOUNTER
----- Message from Surendra Tripp MD sent at 6/6/2018  9:22 AM EDT -----  Her mag level improved   Nothing to worry at this time